# Patient Record
Sex: MALE | Race: BLACK OR AFRICAN AMERICAN | NOT HISPANIC OR LATINO | URBAN - METROPOLITAN AREA
[De-identification: names, ages, dates, MRNs, and addresses within clinical notes are randomized per-mention and may not be internally consistent; named-entity substitution may affect disease eponyms.]

---

## 2024-01-26 ENCOUNTER — HOSPITAL ENCOUNTER (EMERGENCY)
Facility: HOSPITAL | Age: 29
Discharge: HOME | End: 2024-01-26
Payer: COMMERCIAL

## 2024-01-26 ENCOUNTER — APPOINTMENT (OUTPATIENT)
Dept: RADIOLOGY | Facility: HOSPITAL | Age: 29
End: 2024-01-26
Payer: COMMERCIAL

## 2024-01-26 VITALS
RESPIRATION RATE: 18 BRPM | TEMPERATURE: 97.5 F | WEIGHT: 170 LBS | DIASTOLIC BLOOD PRESSURE: 74 MMHG | BODY MASS INDEX: 22.53 KG/M2 | SYSTOLIC BLOOD PRESSURE: 121 MMHG | HEART RATE: 56 BPM | OXYGEN SATURATION: 99 % | HEIGHT: 73 IN

## 2024-01-26 DIAGNOSIS — M54.31 SCIATICA, RIGHT SIDE: ICD-10-CM

## 2024-01-26 DIAGNOSIS — M79.671 RIGHT FOOT PAIN: ICD-10-CM

## 2024-01-26 DIAGNOSIS — M25.551 RIGHT HIP PAIN: Primary | ICD-10-CM

## 2024-01-26 PROCEDURE — 99283 EMERGENCY DEPT VISIT LOW MDM: CPT | Mod: 27

## 2024-01-26 PROCEDURE — 2500000001 HC RX 250 WO HCPCS SELF ADMINISTERED DRUGS (ALT 637 FOR MEDICARE OP): Performed by: PHYSICIAN ASSISTANT

## 2024-01-26 PROCEDURE — 2500000004 HC RX 250 GENERAL PHARMACY W/ HCPCS (ALT 636 FOR OP/ED): Performed by: PHYSICIAN ASSISTANT

## 2024-01-26 PROCEDURE — 99284 EMERGENCY DEPT VISIT MOD MDM: CPT | Mod: 27

## 2024-01-26 PROCEDURE — 73502 X-RAY EXAM HIP UNI 2-3 VIEWS: CPT | Mod: RIGHT SIDE | Performed by: RADIOLOGY

## 2024-01-26 PROCEDURE — 73502 X-RAY EXAM HIP UNI 2-3 VIEWS: CPT | Mod: RT

## 2024-01-26 PROCEDURE — 96372 THER/PROPH/DIAG INJ SC/IM: CPT

## 2024-01-26 RX ORDER — CYCLOBENZAPRINE HCL 5 MG
5 TABLET ORAL ONCE
Status: COMPLETED | OUTPATIENT
Start: 2024-01-26 | End: 2024-01-26

## 2024-01-26 RX ORDER — PREDNISONE 20 MG/1
20 TABLET ORAL DAILY
Qty: 4 TABLET | Refills: 0 | Status: SHIPPED | OUTPATIENT
Start: 2024-01-26 | End: 2024-01-31 | Stop reason: ALTCHOICE

## 2024-01-26 RX ORDER — PREDNISONE 20 MG/1
20 TABLET ORAL ONCE
Status: COMPLETED | OUTPATIENT
Start: 2024-01-26 | End: 2024-01-26

## 2024-01-26 RX ORDER — CYCLOBENZAPRINE HCL 5 MG
5 TABLET ORAL 3 TIMES DAILY PRN
Qty: 12 TABLET | Refills: 0 | Status: SHIPPED | OUTPATIENT
Start: 2024-01-26 | End: 2024-01-30 | Stop reason: SDUPTHER

## 2024-01-26 RX ORDER — KETOROLAC TROMETHAMINE 30 MG/ML
30 INJECTION, SOLUTION INTRAMUSCULAR; INTRAVENOUS ONCE
Status: COMPLETED | OUTPATIENT
Start: 2024-01-26 | End: 2024-01-26

## 2024-01-26 RX ADMIN — PREDNISONE 20 MG: 20 TABLET ORAL at 14:20

## 2024-01-26 RX ADMIN — KETOROLAC TROMETHAMINE 30 MG: 30 INJECTION, SOLUTION INTRAMUSCULAR; INTRAVENOUS at 14:20

## 2024-01-26 RX ADMIN — CYCLOBENZAPRINE HYDROCHLORIDE 5 MG: 5 TABLET, FILM COATED ORAL at 14:20

## 2024-01-26 ASSESSMENT — PAIN DESCRIPTION - LOCATION: LOCATION: BACK

## 2024-01-26 ASSESSMENT — COLUMBIA-SUICIDE SEVERITY RATING SCALE - C-SSRS
6. HAVE YOU EVER DONE ANYTHING, STARTED TO DO ANYTHING, OR PREPARED TO DO ANYTHING TO END YOUR LIFE?: NO
1. IN THE PAST MONTH, HAVE YOU WISHED YOU WERE DEAD OR WISHED YOU COULD GO TO SLEEP AND NOT WAKE UP?: NO
2. HAVE YOU ACTUALLY HAD ANY THOUGHTS OF KILLING YOURSELF?: NO

## 2024-01-26 ASSESSMENT — PAIN - FUNCTIONAL ASSESSMENT: PAIN_FUNCTIONAL_ASSESSMENT: 0-10

## 2024-01-26 ASSESSMENT — PAIN DESCRIPTION - ORIENTATION: ORIENTATION: RIGHT

## 2024-01-26 ASSESSMENT — PAIN SCALES - GENERAL
PAINLEVEL_OUTOF10: 10 - WORST POSSIBLE PAIN
PAINLEVEL_OUTOF10: 0 - NO PAIN
PAINLEVEL_OUTOF10: 9

## 2024-01-26 NOTE — DISCHARGE INSTRUCTIONS
Please begin taking prednisone.  Take this medication daily for the next 4 days.  Do not take today because you are given your dose in the ER.  May take cyclobenzaprine as needed for pain.  This is a muscle relaxer may cause drowsiness.  Do not drive, drink alcohol or operate heavy machinery while using this medication.  Continue Tylenol and/or NSAIDs as needed for pain.  Follow-up with PMR.  Follow-up with primary care.  Return to ER for any new or worsening symptoms.

## 2024-01-26 NOTE — ED PROVIDER NOTES
"Chief Complaint   Patient presents with    Back Pain   HPI:   Shankar Hays is an 28 y.o. with history of craniectomy, right partial meniscus tear, left rotator cuff surgery that presents to the ED for evaluation of right hip pain that radiates from right hip distally across anterior thigh into medial knee and down medial aspect of leg into foot.  Patient said the pain has been bothering him off and on for 2 years.  Over the past 6 months it has gotten worse.  He endorses 7/10 pain at rest.  8-9/10 pain when moving or walking.  Occasionally he gets \"zaps\" of electrical pain down the leg that can be 10/10 or worse. He said those zaps come at random times and do not seem to be related to anything that he can identify. Has not taken any medication for the pain recently.  He denies any numbness.  Endorses occasional tingling.  Denies any trauma or injury.  Said he feels like his right foot arch is swelling.  Denies any back pain.  Denies any loss of bowel or bladder, urinary retention, saddle anesthesia, history of IVDA or cancer, fevers.  Patient does note that he works for [employer] and stands on cement floors for his entire shift.  He wears special shoes with inserts but he said they do not help.    Medications: None  Soc HX: Works at [employer].  Occasional marijuana use  Allergies   Allergen Reactions    Bactrim [Sulfamethoxazole-Trimethoprim] Hives   :  No past medical history on file.  No past surgical history on file.  No family history on file.     Physical Exam  Vitals and nursing note reviewed.   Constitutional:       General: He is not in acute distress.     Appearance: Normal appearance. He is not ill-appearing or toxic-appearing.      Comments: Tall and slender   HENT:      Right Ear: External ear normal.      Left Ear: External ear normal.   Eyes:      Pupils: Pupils are equal, round, and reactive to light.   Cardiovascular:      Rate and Rhythm: Normal rate and regular rhythm.      Pulses: Normal pulses.      " Heart sounds: No murmur heard.  Pulmonary:      Effort: Pulmonary effort is normal. No respiratory distress.      Breath sounds: Normal breath sounds.   Abdominal:      General: Bowel sounds are normal.      Palpations: Abdomen is soft.      Tenderness: There is no abdominal tenderness. There is no guarding or rebound.      Hernia: No hernia is present.      Comments: No hernia   Musculoskeletal:         General: No deformity or signs of injury. Normal range of motion.      Cervical back: Normal range of motion. No tenderness.      Comments: Point tenderness right hip and medial right knee.  Tenderness with palpation along the arch of right foot.  Passive ROM of ankle knee and hip elicits pain.  Normal flexion and extension.  No obvious skin changes, erythema, warmth.   Skin:     General: Skin is warm and dry.      Capillary Refill: Capillary refill takes less than 2 seconds.      Findings: No bruising or rash.   Neurological:      General: No focal deficit present.      Mental Status: He is alert.      Cranial Nerves: No cranial nerve deficit.      Sensory: No sensory deficit.      Motor: No weakness.      Gait: Gait abnormal (Antalgic gait).      Deep Tendon Reflexes: Reflexes normal.   VS: As documented in the triage note and EMR flowsheet from this visit were reviewed.      Medical Decision Making:   ED Course as of 01/26/24 1701   Fri Jan 26, 2024   1349 Vitals Reviewed: Afebrile. Normotensive. Not tachycardic nor tachypneic. No hypoxia.   [KA]   1403 Patient is well-appearing 28-year-old male that presents to the ED for evaluation of right hip pain.  Antalgic gait.  Tenderness to palpation right hip.  Positive right-sided straight leg raise.  5/5 strength bilateral lower extremities.  Normal DTRs.  Symmetrical pulses.  He reports pain with passive ROM of the ankle knee and hip along with tenderness to palpation along the arch of the foot.  Will obtain x-ray of the hip/pelvis.  Patient to be given Toradol,  Flexeril and 20 mg prednisone. [KA]   6197 X-ray imaging normal.  Patient reports improvement in symptoms following medication administration.  Will discharge home on prednisone burst with Flexeril.  Advised that he should continue Tylenol as needed.  Have placed PMR referral.  He says he is seeing his PCP for this issue on Tuesday.  Patient to be given work note.  Encouraged him to return to ER for any new or worsening symptoms. [KA]      ED Course User Index  [KA] Humaira Marcus PA-C         Diagnoses as of 01/26/24 1701   Right hip pain   Right foot pain   Sciatica, right side      Escalation of Care: Appropriate for outpatient management   Counseling: Spoke with the patient and discussed today´s findings, in addition to providing specific details for the plan of care and expected course.  Patient was given the opportunity to ask questions.    Discussed return precautions and importance of follow-up.  Advised to follow-up with PCP/Ortho/PMR.  Advised to return to the ED for changing or worsening symptoms, new symptoms, complaint specific precautions, and precautions listed on the discharge paperwork.  Educated on the common potential side effects of medications prescribed.    I advised the patient that the emergency evaluation and treatment provided today doesn't end their need for medical care. It is very important that they follow-up with their primary care provider or other specialist as instructed.    The plan of care was mutually agreed upon with the patient. The patient and/or family were given the opportunity to ask questions. All questions asked today in the ED were answered to the best of my ability with today's information.    I specifically advised the patient to return to the ED for changing or worsening symptoms, worrisome new symptoms, or for any complaint specific precautions listed on the discharge paperwork.    This patient was cared for in the setting of nationwide stress on resources and  staffing.    This report was transcribed using voice recognition software.  Every effort was made to ensure accuracy, however, inadvertently computerized transcription errors may be present.       Humaira Marcus PA-C  01/26/24 3082

## 2024-01-26 NOTE — Clinical Note
Shankar Hays was seen and treated in our emergency department on 1/26/2024.  He may return to work on 01/27/2024.  Light duty for the next 5 days or until cleared by primary care doctor     If you have any questions or concerns, please don't hesitate to call.      Humaira Marcus PA-C

## 2024-01-26 NOTE — ED TRIAGE NOTES
Pt arrives via triage with complaint of right hip pain radiating down to the right foot. St has happened in the past and improved with chiropractor. Pt has hx of sciatic nerve pain. Ambulatory in triage.

## 2024-01-30 ENCOUNTER — OFFICE VISIT (OUTPATIENT)
Dept: PRIMARY CARE | Facility: CLINIC | Age: 29
End: 2024-01-30
Payer: MEDICARE

## 2024-01-30 ENCOUNTER — PHARMACY VISIT (OUTPATIENT)
Dept: PHARMACY | Facility: CLINIC | Age: 29
End: 2024-01-30
Payer: COMMERCIAL

## 2024-01-30 VITALS
HEART RATE: 66 BPM | TEMPERATURE: 98.6 F | BODY MASS INDEX: 21.93 KG/M2 | WEIGHT: 166.2 LBS | SYSTOLIC BLOOD PRESSURE: 136 MMHG | DIASTOLIC BLOOD PRESSURE: 74 MMHG | OXYGEN SATURATION: 97 %

## 2024-01-30 DIAGNOSIS — M79.671 RIGHT FOOT PAIN: ICD-10-CM

## 2024-01-30 DIAGNOSIS — G89.29 CHRONIC PAIN OF RIGHT KNEE: ICD-10-CM

## 2024-01-30 DIAGNOSIS — Z76.89 ENCOUNTER TO ESTABLISH CARE: Primary | ICD-10-CM

## 2024-01-30 DIAGNOSIS — M25.561 CHRONIC PAIN OF RIGHT KNEE: ICD-10-CM

## 2024-01-30 DIAGNOSIS — M25.551 RIGHT HIP PAIN: ICD-10-CM

## 2024-01-30 DIAGNOSIS — Z02.89 ENCOUNTER FOR COMPLETION OF FORM WITH PATIENT: ICD-10-CM

## 2024-01-30 PROCEDURE — RXMED WILLOW AMBULATORY MEDICATION CHARGE

## 2024-01-30 PROCEDURE — 99213 OFFICE O/P EST LOW 20 MIN: CPT | Performed by: STUDENT IN AN ORGANIZED HEALTH CARE EDUCATION/TRAINING PROGRAM

## 2024-01-30 RX ORDER — CYCLOBENZAPRINE HCL 5 MG
5 TABLET ORAL 3 TIMES DAILY PRN
Qty: 30 TABLET | Refills: 1 | Status: SHIPPED | OUTPATIENT
Start: 2024-01-30 | End: 2024-05-01 | Stop reason: HOSPADM

## 2024-01-30 RX ORDER — GABAPENTIN 100 MG/1
100 CAPSULE ORAL 3 TIMES DAILY
Qty: 90 CAPSULE | Refills: 1 | Status: SHIPPED | OUTPATIENT
Start: 2024-01-30 | End: 2024-05-01 | Stop reason: HOSPADM

## 2024-01-30 NOTE — PATIENT INSTRUCTIONS
Dear Shankar Hays,     It was a pleasure getting to manage your care with you today.     Prescriptions:  We have sent medication prescriptions to your pharmacy on file. Please pick them up at your earliest convenience.   Take gabapentin every day. Start taking 1 pill at night for a few days. If you tolerate it well, increase to taking it 3 times a day.  Take Flexeril 3 times a day as needed.  Both gabapentin and Flexeril can make you sleepy, make sure you know how you respond before taking them at work.    Referral:   We have provided you the below referrals. Please call to schedule referrals if no one has contacted you within 3 days.   1. X-ray back and knee  2. Podiatry (foot doctor)    Follow up Appointment: 6 weeks for pain follow up    Emergency  In the case of an emergency please call 911 or visit the Emergency Department immediately for evaluation.     We look forward to continuing your care here at our Clinic. Take Care.     Sincerely,   Babatunde Sepulveda MD

## 2024-01-30 NOTE — PROGRESS NOTES
"Subjective   Patient ID: Shankar Hays is a 28 y.o. male who presents for paperwork for work.    Having \"sciatic nerve\" pain and needs accommodations for work. Also notes that right foot is swollen and painful, limiting ability to stand for prolonged periods. Shifts are usually 10.5 hours of standing and he is developing pretty severe pain by the 7 hour ronit. Pain started about 1-2 years ago at right lower back. Felt like a shooting pain that would make him wince and make his leg give out. Pain started moving more to the lateral hip as well with a tingle. Also noting pain now in the knee and bottom of the foot. Pain feels like fire. Symptom is happening daily. Foot swelling has really only been over the last month and he feels like his arch is collapsing. No injuries to the back or foot; had a R knee injury to the meniscus in 2018 that was not managed surgically. Trying to bend the foot when it's swollen causes a burning pain. Wears composite boots for work. No clear triggers for the pain; seems to be based on maintaining a certain position for too long. No history of surgery to the back or leg. Gets slight swelling around the right knee. No heavy lifting for work, max is probably 30 lbs. Denies any numbness or tingling in the feet.      Review of Systems  As above in HPI    Objective   /74 (BP Location: Right arm, Patient Position: Sitting, BP Cuff Size: Adult)   Pulse 66   Temp 37 °C (98.6 °F) (Temporal)   Wt 75.4 kg (166 lb 3.2 oz)   SpO2 97%   BMI 21.93 kg/m²  Body mass index is 21.93 kg/m².    Physical Exam  Vitals reviewed.   Constitutional:       General: He is not in acute distress.     Appearance: Normal appearance.   HENT:      Head: Normocephalic and atraumatic.   Eyes:      Conjunctiva/sclera: Conjunctivae normal.   Cardiovascular:      Rate and Rhythm: Normal rate and regular rhythm.      Heart sounds: No murmur heard.     No friction rub. No gallop.   Pulmonary:      Effort: Pulmonary " effort is normal. No respiratory distress.      Breath sounds: Normal breath sounds. No wheezing, rhonchi or rales.   Musculoskeletal:      Cervical back: Normal and neck supple.      Thoracic back: Normal.      Lumbar back: Tenderness (mild R paralumbar tenderness) present. No bony tenderness. Positive right straight leg raise test (pain with straight leg raise, but pain stops at the knee). Negative left straight leg raise test.      Right hip: Tenderness (mild tenderness over lateral hip and inguinal region) present.      Right knee: Tenderness (mild tenderness superomedial to the knee joint; no tenderness of the knee joint itself) present.      Right lower leg: Normal.      Right foot: Normal capillary refill. Swelling (per patient, no significant edema evident on exam) and tenderness (generalized, non-localized tenderness of the foot) present. Normal pulse.   Skin:     General: Skin is warm and dry.   Neurological:      General: No focal deficit present.      Mental Status: He is alert. Mental status is at baseline.      Sensory: Sensation is intact. No sensory deficit (grossly intact).      Motor: No weakness (5/5 strength with hip flexion, abduction, adduction, knee flexion and extension).      Gait: Gait abnormal (antalgic gait favoring RLE).   Psychiatric:         Mood and Affect: Affect normal.         Behavior: Behavior normal.       Assessment/Plan   Shankar Hays is a 29yo male with a history of pansinusitis with cranial epidural abscess s/p evacuation who presents to [facility] care and for evaluation of RLE pain.    Problem List Items Addressed This Visit             ICD-10-CM    Right foot pain M79.671     -suspect pain and swelling in the foot is separate from pain in the hip, although changes in gait mechanics may be contributing  -suspect foot pain is more related to prolonged standing and shoe fit  -referral to podiatry for further evaluation         Relevant Medications    cyclobenzaprine  (Flexeril) 5 mg tablet    Other Relevant Orders    Referral to Podiatry    Right hip pain M25.551     -no clear localizing source of pain on exam although given description that it starts in the right lateral lower back and radiates to the front I suspect pain is more radicular in nature than from the hip joint  -recent negative hip x-ray at urgent care  -XR lumbar spine for further evaluation  -pain management with PRN Flexeril, PRN NSAIDs and Tylenol  -trial of gabapentin 100 mg TID for possible neuropathic component  -PT evaluation         Relevant Medications    cyclobenzaprine (Flexeril) 5 mg tablet    gabapentin (Neurontin) 100 mg capsule    Other Relevant Orders    XR lumbar spine complete 4+ views    Referral to Physical Therapy    Chronic pain of right knee M25.561, G89.29     -history of R knee partial meniscus tear, managed conservatively w/ PT in the past now with recurrent knee pain  -suspect knee pain is related to exacerbation of prior injury d/t altered gait mechanics  -given history of meniscal injury will obtain x-ray for further evaluation  -pain management and PT referral as for hip pain         Relevant Orders    XR knee right 3 views     Other Visit Diagnoses         Codes    Encounter to establish care    -  Primary Z76.89    Encounter for completion of form with patient     Z02.89    -LA paperwork completed for patient         Follow up in 6 weeks for reassessment of pain.    Patient discussed with Dr. Nielson.    Babatunde Sepulveda MD   PGY-3

## 2024-01-31 PROBLEM — G89.29 CHRONIC PAIN OF RIGHT KNEE: Status: ACTIVE | Noted: 2024-01-31

## 2024-01-31 PROBLEM — M79.671 RIGHT FOOT PAIN: Status: ACTIVE | Noted: 2024-01-31

## 2024-01-31 PROBLEM — M25.551 PAIN OF RIGHT HIP JOINT: Status: ACTIVE | Noted: 2024-01-31

## 2024-01-31 PROBLEM — M25.561 CHRONIC PAIN OF RIGHT KNEE: Status: ACTIVE | Noted: 2024-01-31

## 2024-01-31 RX ORDER — NAPROXEN 250 MG/1
250 TABLET ORAL
COMMUNITY
End: 2024-05-01 | Stop reason: HOSPADM

## 2024-01-31 RX ORDER — FLUTICASONE PROPIONATE 50 MCG
1 SPRAY, SUSPENSION (ML) NASAL DAILY
COMMUNITY

## 2024-02-01 NOTE — ASSESSMENT & PLAN NOTE
-history of R knee partial meniscus tear, managed conservatively w/ PT in the past now with recurrent knee pain  -suspect knee pain is related to exacerbation of prior injury d/t altered gait mechanics  -given history of meniscal injury will obtain x-ray for further evaluation  -pain management and PT referral as for hip pain

## 2024-02-01 NOTE — ASSESSMENT & PLAN NOTE
-no clear localizing source of pain on exam although given description that it starts in the right lateral lower back and radiates to the front I suspect pain is more radicular in nature than from the hip joint  -recent negative hip x-ray at urgent care  -XR lumbar spine for further evaluation  -pain management with PRN Flexeril, PRN NSAIDs and Tylenol  -trial of gabapentin 100 mg TID for possible neuropathic component  -PT evaluation

## 2024-02-01 NOTE — ASSESSMENT & PLAN NOTE
-suspect pain and swelling in the foot is separate from pain in the hip, although changes in gait mechanics may be contributing  -suspect foot pain is more related to prolonged standing and shoe fit  -referral to podiatry for further evaluation

## 2024-02-03 NOTE — PROGRESS NOTES
I reviewed the resident/fellow's documentation and discussed the patient with the resident/fellow. I agree with the resident/fellow's medical decision making as documented in the note.     Linda Nielson MD

## 2024-02-16 ENCOUNTER — HOSPITAL ENCOUNTER (EMERGENCY)
Facility: HOSPITAL | Age: 29
Discharge: HOME | End: 2024-02-16
Payer: COMMERCIAL

## 2024-02-16 ENCOUNTER — APPOINTMENT (OUTPATIENT)
Dept: RADIOLOGY | Facility: HOSPITAL | Age: 29
End: 2024-02-16
Payer: COMMERCIAL

## 2024-02-16 ENCOUNTER — TELEMEDICINE (OUTPATIENT)
Dept: PRIMARY CARE | Facility: CLINIC | Age: 29
End: 2024-02-16
Payer: COMMERCIAL

## 2024-02-16 VITALS
SYSTOLIC BLOOD PRESSURE: 115 MMHG | RESPIRATION RATE: 16 BRPM | TEMPERATURE: 97.7 F | DIASTOLIC BLOOD PRESSURE: 70 MMHG | HEART RATE: 90 BPM | OXYGEN SATURATION: 98 %

## 2024-02-16 DIAGNOSIS — M72.2 PLANTAR FASCIITIS: ICD-10-CM

## 2024-02-16 DIAGNOSIS — M54.31 SCIATICA OF RIGHT SIDE: Primary | ICD-10-CM

## 2024-02-16 DIAGNOSIS — M79.671 RIGHT FOOT PAIN: Primary | ICD-10-CM

## 2024-02-16 PROCEDURE — 96372 THER/PROPH/DIAG INJ SC/IM: CPT

## 2024-02-16 PROCEDURE — 73630 X-RAY EXAM OF FOOT: CPT | Mod: RIGHT SIDE | Performed by: RADIOLOGY

## 2024-02-16 PROCEDURE — 2500000004 HC RX 250 GENERAL PHARMACY W/ HCPCS (ALT 636 FOR OP/ED): Performed by: PHYSICIAN ASSISTANT

## 2024-02-16 PROCEDURE — 99284 EMERGENCY DEPT VISIT MOD MDM: CPT

## 2024-02-16 PROCEDURE — 73630 X-RAY EXAM OF FOOT: CPT | Mod: RT

## 2024-02-16 PROCEDURE — 99283 EMERGENCY DEPT VISIT LOW MDM: CPT

## 2024-02-16 PROCEDURE — 99284 EMERGENCY DEPT VISIT MOD MDM: CPT | Performed by: PHYSICIAN ASSISTANT

## 2024-02-16 RX ORDER — CYCLOBENZAPRINE HCL 10 MG
10 TABLET ORAL 3 TIMES DAILY PRN
Qty: 9 TABLET | Refills: 0 | Status: SHIPPED | OUTPATIENT
Start: 2024-02-16 | End: 2024-05-01 | Stop reason: HOSPADM

## 2024-02-16 RX ORDER — KETOROLAC TROMETHAMINE 30 MG/ML
30 INJECTION, SOLUTION INTRAMUSCULAR; INTRAVENOUS ONCE
Status: COMPLETED | OUTPATIENT
Start: 2024-02-16 | End: 2024-02-16

## 2024-02-16 RX ORDER — ORPHENADRINE CITRATE 30 MG/ML
60 INJECTION INTRAMUSCULAR; INTRAVENOUS ONCE
Status: COMPLETED | OUTPATIENT
Start: 2024-02-16 | End: 2024-02-16

## 2024-02-16 RX ORDER — NAPROXEN 500 MG/1
500 TABLET ORAL
Qty: 14 TABLET | Refills: 0 | Status: SHIPPED | OUTPATIENT
Start: 2024-02-16 | End: 2024-02-23

## 2024-02-16 RX ADMIN — KETOROLAC TROMETHAMINE 30 MG: 30 INJECTION INTRAMUSCULAR; INTRAVENOUS at 11:30

## 2024-02-16 RX ADMIN — ORPHENADRINE CITRATE 60 MG: 60 INJECTION INTRAMUSCULAR; INTRAVENOUS at 11:30

## 2024-02-16 ASSESSMENT — LIFESTYLE VARIABLES
EVER FELT BAD OR GUILTY ABOUT YOUR DRINKING: NO
EVER HAD A DRINK FIRST THING IN THE MORNING TO STEADY YOUR NERVES TO GET RID OF A HANGOVER: NO
HAVE PEOPLE ANNOYED YOU BY CRITICIZING YOUR DRINKING: NO
HAVE YOU EVER FELT YOU SHOULD CUT DOWN ON YOUR DRINKING: NO

## 2024-02-16 ASSESSMENT — PAIN - FUNCTIONAL ASSESSMENT: PAIN_FUNCTIONAL_ASSESSMENT: 0-10

## 2024-02-16 ASSESSMENT — PAIN DESCRIPTION - LOCATION: LOCATION: FOOT

## 2024-02-16 ASSESSMENT — PAIN DESCRIPTION - ORIENTATION: ORIENTATION: RIGHT

## 2024-02-16 ASSESSMENT — PAIN SCALES - GENERAL: PAINLEVEL_OUTOF10: 7

## 2024-02-16 NOTE — Clinical Note
Shankar Hays was seen and treated in our emergency department on 2/16/2024.  He may return to work on 02/18/2024.       If you have any questions or concerns, please don't hesitate to call.      Jeni Blank PA-C

## 2024-02-16 NOTE — ED PROVIDER NOTES
This is a 28-year-old male with a past medical history of previous craniotomy as well as sciatica who presents to the ED with right foot pain and swelling around the arch of his foot.  He states that he has had this intermittently for several months.  He also states he will occasionally have pain to his low back that radiates down his leg.  He states that he was told that he had sciatica and this is why this happens to him.  He does have a follow-up scheduled with his primary care provider next month.  He states that for his job he does a large amount of standing which he feels worsens his pain.  He states that today his pain was worse which is why he came into the ED.  He also states that he had to miss work today.  He states that he was previously taking gabapentin, steroid as well as Flexeril, however ran out of Flexeril.  He believes these medications were helping his symptoms.  He denies any new injury or trauma.      History provided by:  Patient   used: No             Visit Vitals  /70   Pulse 90   Temp 36.5 °C (97.7 °F) (Temporal)   Resp 16   SpO2 98%   Smoking Status Some Days          Physical Exam     Physical exam:   General: Vitals noted, no distress. Afebrile.   EENT:  Hearing grossly intact. Normal phonation. MMM. Airway patient. PERRL. EOMI.   Neck: No midline tenderness or paraspinal tenderness. FROM.   Cardiac: Regular, rate, rhythm. Normal S1 and S2.  No murmurs, gallops, rubs.   Pulmonary: Good air exchange. Lungs clear bilaterally. No wheezes, rhonchi, rales. No accessory muscle use.   Back: No CVA tenderness.  No midline T or L-spine tenderness palpation.  Right-sided paraspinal lumbar tenderness palpation without any obvious deformity or step-off.  No obvious deformity.   Extremities: No peripheral edema.  Full range of motion. Moves all extremities freely.  Tender to palpation to the plantar aspect of the right foot along the medial aspect, low arch present.  No  associated erythema or excess warmth.  No significant edema.  DP PT pulses full and equal bilaterally.  No other tenderness throughout the lower extremities bilaterally.  Skin: No rash. Warm and Dry.   Neuro: No focal neurologic deficits. CN 2-12 grossly intact. Sensation equal bilaterally. No weakness.         Labs Reviewed - No data to display    No orders to display         ED Course & MDM     Medical Decision Making  This is a 28-year-old male with a past medical history of previous sciatica who presents to the ED with concern for flareup of the sciatic his right foot pain along his arch.  Vital stable upon arrival to the ED.  On physical examination patient was neurovascular intact throughout upper and lower extremities.  He was able to ambulate with a steady gait.  He had full strength and sensation in his lower extremities.  He denied any episodes of bowel or bladder incontinence or saddle anesthesia.  Based on patient's exam and description of his symptoms I have low suspicion for acute pathology involving the patient's spinal cord.  On examination of his right foot patient did have a low arch with tenderness palpation over the arch on the medial aspect of his foot.  DP and PT pulses full and equal bilaterally.  No associated erythema or excess warmth.  No associated edema.  X-ray of patient's foot was ordered.  He was medicated Toradol and Norflex for his pain.  X-ray showed no acute fracture or dislocation of right foot, however there were findings associated with anterior ankle impingement.  On my reevaluation patient was feeling improved and was requesting to be discharged home with a work note.  He was informed of his x-ray results.  He was advised to follow-up with his primary care provider as scheduled.  He was given a prescription for Flexeril and naproxen for his symptoms at home and was discharged from the ED in stable condition.    Amount and/or Complexity of Data Reviewed  Radiology: ordered and  independent interpretation performed.     Details: No visualized fracture or dislocation of patient's right foot    Risk  Prescription drug management.         Diagnoses as of 02/16/24 1232   Sciatica of right side   Plantar fasciitis       Procedures    MARK Chapman, PA-C     Jeni Blank PA-C  02/16/24 1236

## 2024-02-16 NOTE — PROGRESS NOTES
Discussed with patient that this requires in person examination for which he did recently have and was referred to see podiatry for his condition.  This is not something I can appropriately address on the online [employer] care platform.  He is agreeable to seek in person outpatient care accordingly.  I did recommend that he contact his insurance to get the name of 3 podiatrists that are in his network and to call those offices today to see if he can connect with one of them.  He states the podiatrist he was referred to by his PCP was not in his network.

## 2024-03-12 ENCOUNTER — APPOINTMENT (OUTPATIENT)
Dept: PRIMARY CARE | Facility: HOSPITAL | Age: 29
End: 2024-03-12
Payer: COMMERCIAL

## 2024-04-29 ENCOUNTER — CLINICAL SUPPORT (OUTPATIENT)
Dept: EMERGENCY MEDICINE | Facility: HOSPITAL | Age: 29
End: 2024-04-29
Payer: MEDICARE

## 2024-04-29 ENCOUNTER — HOSPITAL ENCOUNTER (EMERGENCY)
Facility: HOSPITAL | Age: 29
Discharge: PSYCHIATRIC OR SUBSTANCE USE FACILITY | End: 2024-04-30
Attending: EMERGENCY MEDICINE
Payer: MEDICARE

## 2024-04-29 DIAGNOSIS — R45.851 SUICIDAL IDEATION: Primary | ICD-10-CM

## 2024-04-29 PROBLEM — F32.A DEPRESSION WITH SUICIDAL IDEATION: Status: ACTIVE | Noted: 2024-04-29

## 2024-04-29 LAB
ALBUMIN SERPL BCP-MCNC: 5.2 G/DL (ref 3.4–5)
ALP SERPL-CCNC: 55 U/L (ref 33–120)
ALT SERPL W P-5'-P-CCNC: 10 U/L (ref 10–52)
AMPHETAMINES UR QL SCN: ABNORMAL
ANION GAP SERPL CALC-SCNC: 18 MMOL/L (ref 10–20)
APAP SERPL-MCNC: <10 UG/ML
AST SERPL W P-5'-P-CCNC: 18 U/L (ref 9–39)
ATRIAL RATE: 80 BPM
BARBITURATES UR QL SCN: ABNORMAL
BASOPHILS # BLD AUTO: 0.03 X10*3/UL (ref 0–0.1)
BASOPHILS NFR BLD AUTO: 0.3 %
BENZODIAZ UR QL SCN: ABNORMAL
BILIRUB SERPL-MCNC: 0.6 MG/DL (ref 0–1.2)
BUN SERPL-MCNC: 10 MG/DL (ref 6–23)
BZE UR QL SCN: ABNORMAL
CALCIUM SERPL-MCNC: 10.2 MG/DL (ref 8.6–10.6)
CANNABINOIDS UR QL SCN: ABNORMAL
CHLORIDE SERPL-SCNC: 105 MMOL/L (ref 98–107)
CO2 SERPL-SCNC: 25 MMOL/L (ref 21–32)
CREAT SERPL-MCNC: 1.01 MG/DL (ref 0.5–1.3)
EGFRCR SERPLBLD CKD-EPI 2021: >90 ML/MIN/1.73M*2
EOSINOPHIL # BLD AUTO: 0.12 X10*3/UL (ref 0–0.7)
EOSINOPHIL NFR BLD AUTO: 1.2 %
ERYTHROCYTE [DISTWIDTH] IN BLOOD BY AUTOMATED COUNT: 11.6 % (ref 11.5–14.5)
ETHANOL SERPL-MCNC: 169 MG/DL
FENTANYL+NORFENTANYL UR QL SCN: ABNORMAL
FLUAV RNA RESP QL NAA+PROBE: NOT DETECTED
FLUBV RNA RESP QL NAA+PROBE: NOT DETECTED
GLUCOSE SERPL-MCNC: 88 MG/DL (ref 74–99)
HCT VFR BLD AUTO: 42.6 % (ref 41–52)
HGB BLD-MCNC: 14.9 G/DL (ref 13.5–17.5)
IMM GRANULOCYTES # BLD AUTO: 0.02 X10*3/UL (ref 0–0.7)
IMM GRANULOCYTES NFR BLD AUTO: 0.2 % (ref 0–0.9)
LYMPHOCYTES # BLD AUTO: 4.24 X10*3/UL (ref 1.2–4.8)
LYMPHOCYTES NFR BLD AUTO: 43 %
MCH RBC QN AUTO: 33 PG (ref 26–34)
MCHC RBC AUTO-ENTMCNC: 35 G/DL (ref 32–36)
MCV RBC AUTO: 94 FL (ref 80–100)
METHADONE UR QL SCN: ABNORMAL
MONOCYTES # BLD AUTO: 0.69 X10*3/UL (ref 0.1–1)
MONOCYTES NFR BLD AUTO: 7 %
NEUTROPHILS # BLD AUTO: 4.77 X10*3/UL (ref 1.2–7.7)
NEUTROPHILS NFR BLD AUTO: 48.3 %
NRBC BLD-RTO: 0 /100 WBCS (ref 0–0)
OPIATES UR QL SCN: ABNORMAL
OXYCODONE+OXYMORPHONE UR QL SCN: ABNORMAL
P AXIS: 74 DEGREES
P OFFSET: 197 MS
P ONSET: 145 MS
PCP UR QL SCN: ABNORMAL
PLATELET # BLD AUTO: 239 X10*3/UL (ref 150–450)
POTASSIUM SERPL-SCNC: 3.6 MMOL/L (ref 3.5–5.3)
PR INTERVAL: 156 MS
PROT SERPL-MCNC: 8.2 G/DL (ref 6.4–8.2)
Q ONSET: 223 MS
QRS COUNT: 13 BEATS
QRS DURATION: 82 MS
QT INTERVAL: 344 MS
QTC CALCULATION(BAZETT): 396 MS
QTC FREDERICIA: 378 MS
R AXIS: 57 DEGREES
RBC # BLD AUTO: 4.52 X10*6/UL (ref 4.5–5.9)
SALICYLATES SERPL-MCNC: <3 MG/DL
SARS-COV-2 RNA RESP QL NAA+PROBE: NOT DETECTED
SODIUM SERPL-SCNC: 144 MMOL/L (ref 136–145)
T AXIS: 56 DEGREES
T OFFSET: 395 MS
VENTRICULAR RATE: 80 BPM
WBC # BLD AUTO: 9.9 X10*3/UL (ref 4.4–11.3)

## 2024-04-29 PROCEDURE — 99222 1ST HOSP IP/OBS MODERATE 55: CPT

## 2024-04-29 PROCEDURE — 87636 SARSCOV2 & INF A&B AMP PRB: CPT

## 2024-04-29 PROCEDURE — 2500000001 HC RX 250 WO HCPCS SELF ADMINISTERED DRUGS (ALT 637 FOR MEDICARE OP)

## 2024-04-29 PROCEDURE — 85025 COMPLETE CBC W/AUTO DIFF WBC: CPT | Performed by: EMERGENCY MEDICINE

## 2024-04-29 PROCEDURE — 80053 COMPREHEN METABOLIC PANEL: CPT | Performed by: EMERGENCY MEDICINE

## 2024-04-29 PROCEDURE — 80307 DRUG TEST PRSMV CHEM ANLYZR: CPT | Performed by: EMERGENCY MEDICINE

## 2024-04-29 PROCEDURE — 99285 EMERGENCY DEPT VISIT HI MDM: CPT | Mod: 25

## 2024-04-29 PROCEDURE — 93010 ELECTROCARDIOGRAM REPORT: CPT | Performed by: PHYSICIAN ASSISTANT

## 2024-04-29 PROCEDURE — 36415 COLL VENOUS BLD VENIPUNCTURE: CPT | Performed by: EMERGENCY MEDICINE

## 2024-04-29 PROCEDURE — 99285 EMERGENCY DEPT VISIT HI MDM: CPT | Performed by: EMERGENCY MEDICINE

## 2024-04-29 PROCEDURE — 80143 DRUG ASSAY ACETAMINOPHEN: CPT | Performed by: EMERGENCY MEDICINE

## 2024-04-29 PROCEDURE — 93005 ELECTROCARDIOGRAM TRACING: CPT

## 2024-04-29 RX ORDER — ACETAMINOPHEN 500 MG
5 TABLET ORAL DAILY
Status: DISCONTINUED | OUTPATIENT
Start: 2024-04-29 | End: 2024-04-30 | Stop reason: HOSPADM

## 2024-04-29 RX ADMIN — Medication 5 MG: at 19:00

## 2024-04-29 ASSESSMENT — LIFESTYLE VARIABLES
EVER HAD A DRINK FIRST THING IN THE MORNING TO STEADY YOUR NERVES TO GET RID OF A HANGOVER: NO
HAVE PEOPLE ANNOYED YOU BY CRITICIZING YOUR DRINKING: NO
EVER FELT BAD OR GUILTY ABOUT YOUR DRINKING: NO
HAVE YOU EVER FELT YOU SHOULD CUT DOWN ON YOUR DRINKING: NO
TOTAL SCORE: 0

## 2024-04-29 ASSESSMENT — COLUMBIA-SUICIDE SEVERITY RATING SCALE - C-SSRS
6. HAVE YOU EVER DONE ANYTHING, STARTED TO DO ANYTHING, OR PREPARED TO DO ANYTHING TO END YOUR LIFE?: YES
1. IN THE PAST MONTH, HAVE YOU WISHED YOU WERE DEAD OR WISHED YOU COULD GO TO SLEEP AND NOT WAKE UP?: YES
1. SINCE LAST CONTACT, HAVE YOU WISHED YOU WERE DEAD OR WISHED YOU COULD GO TO SLEEP AND NOT WAKE UP?: YES
2. HAVE YOU ACTUALLY HAD ANY THOUGHTS OF KILLING YOURSELF?: YES
6. HAVE YOU EVER DONE ANYTHING, STARTED TO DO ANYTHING, OR PREPARED TO DO ANYTHING TO END YOUR LIFE?: NO
4. HAVE YOU HAD THESE THOUGHTS AND HAD SOME INTENTION OF ACTING ON THEM?: YES
2. HAVE YOU ACTUALLY HAD ANY THOUGHTS OF KILLING YOURSELF?: YES
5. HAVE YOU STARTED TO WORK OUT OR WORKED OUT THE DETAILS OF HOW TO KILL YOURSELF? DO YOU INTEND TO CARRY OUT THIS PLAN?: NO
5. HAVE YOU STARTED TO WORK OUT OR WORKED OUT THE DETAILS OF HOW TO KILL YOURSELF? DO YOU INTEND TO CARRY OUT THIS PLAN?: YES

## 2024-04-29 ASSESSMENT — PAIN - FUNCTIONAL ASSESSMENT: PAIN_FUNCTIONAL_ASSESSMENT: 0-10

## 2024-04-29 ASSESSMENT — PAIN DESCRIPTION - PROGRESSION: CLINICAL_PROGRESSION: NOT CHANGED
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
## 2024-04-29 NOTE — PROGRESS NOTES
Handoff Note    I received Shankar Hays in signout from Dr. Terry.  Please see the previous note for all HPI, PE and MDM up to the time of signout at 0700.    In brief Shankar Hays is an 29 y.o. male presenting for   Chief Complaint   Patient presents with   • Psychiatric Evaluation   • Ingestion         At the time of signout, the patient's disposition is pending EPAT evaluation.  This is a 29-year-old male who presented with EMS after he was found lying in the driveway.  He had been mildly intoxicated however was also complaining of suicidal thoughts.  EPAT have been consulted by the previous provider and their evaluation and recommendations are pending at the time of signout.  During my shift, nursing staff had difficulty enforcing elopement precaution rules including having the patient out of his close and into a hospital gown for EPAT assessment.  I was called to the patient's bedside where he expressed frustration due to needing to comply with policies all of a sudden when he had been allowed overnight to rest with his close I am belongings.  Fortunately the patient was able to be reasoned with and eventually agreed to comply with policies in order to speak with EPAT services as he wishes to get help for his mental health.  During the discussion he reports that he took an undisclosed amount of his gabapentin pills in an attempt to hurt himself.  EPAT then able to evaluate the patient and is recommending placement due to his suicidal thoughts and actions.  He remained cooperative and calm and did not require any antipsychotic or anxiolytic medications throughout the rest of the shift.  At this time he is handed off to the oncoming emergency department provider pending placement to psychiatric facility.      Throughout the ED stay, the patient was monitored and re-examined for any changes in stability or symptomatology.     ED Course:   Diagnoses as of 04/30/24 0743   Suicidal ideation         Patient seen  by and discussed with attending emergency medicine physician, Dr. Hernandez    Pt Disposition: Handoff    Procedures      Darci Ayala DO  Emergency Medicine PGY-2  [facility]
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
## 2024-04-30 ENCOUNTER — HOSPITAL ENCOUNTER (INPATIENT)
Facility: HOSPITAL | Age: 29
LOS: 1 days | Discharge: HOME | DRG: 885 | End: 2024-05-01
Attending: PSYCHIATRY & NEUROLOGY | Admitting: PSYCHIATRY & NEUROLOGY
Payer: MEDICARE

## 2024-04-30 VITALS
SYSTOLIC BLOOD PRESSURE: 122 MMHG | OXYGEN SATURATION: 98 % | TEMPERATURE: 97.9 F | BODY MASS INDEX: 22.26 KG/M2 | DIASTOLIC BLOOD PRESSURE: 75 MMHG | RESPIRATION RATE: 16 BRPM | HEART RATE: 61 BPM | HEIGHT: 73 IN | WEIGHT: 168 LBS

## 2024-04-30 DIAGNOSIS — F17.200 TOBACCO DEPENDENCE: ICD-10-CM

## 2024-04-30 DIAGNOSIS — F32.89 OTHER SPECIFIED DEPRESSIVE DISORDER: Primary | ICD-10-CM

## 2024-04-30 LAB
25(OH)D3 SERPL-MCNC: 6 NG/ML (ref 30–100)
CHOLEST SERPL-MCNC: 138 MG/DL (ref 0–199)
CHOLESTEROL/HDL RATIO: 2.9
GLUCOSE P FAST SERPL-MCNC: 107 MG/DL (ref 74–99)
HDLC SERPL-MCNC: 47.7 MG/DL
LDLC SERPL CALC-MCNC: 61 MG/DL
NON HDL CHOLESTEROL: 90 MG/DL (ref 0–149)
TRIGL SERPL-MCNC: 145 MG/DL (ref 0–149)
VLDL: 29 MG/DL (ref 0–40)

## 2024-04-30 PROCEDURE — 82947 ASSAY GLUCOSE BLOOD QUANT: CPT | Performed by: PSYCHIATRY & NEUROLOGY

## 2024-04-30 PROCEDURE — 2500000001 HC RX 250 WO HCPCS SELF ADMINISTERED DRUGS (ALT 637 FOR MEDICARE OP): Performed by: PSYCHIATRY & NEUROLOGY

## 2024-04-30 PROCEDURE — 99221 1ST HOSP IP/OBS SF/LOW 40: CPT | Performed by: NURSE PRACTITIONER

## 2024-04-30 PROCEDURE — 2500000006 HC RX 250 W HCPCS SELF ADMINISTERED DRUGS (ALT 637 FOR ALL PAYERS): Performed by: PSYCHIATRY & NEUROLOGY

## 2024-04-30 PROCEDURE — 1240000001 HC SEMI-PRIVATE BH ROOM DAILY

## 2024-04-30 PROCEDURE — 80061 LIPID PANEL: CPT | Performed by: PSYCHIATRY & NEUROLOGY

## 2024-04-30 PROCEDURE — 36415 COLL VENOUS BLD VENIPUNCTURE: CPT | Performed by: PSYCHIATRY & NEUROLOGY

## 2024-04-30 PROCEDURE — 2500000002 HC RX 250 W HCPCS SELF ADMINISTERED DRUGS (ALT 637 FOR MEDICARE OP, ALT 636 FOR OP/ED): Performed by: PSYCHIATRY & NEUROLOGY

## 2024-04-30 PROCEDURE — 99223 1ST HOSP IP/OBS HIGH 75: CPT

## 2024-04-30 PROCEDURE — 82306 VITAMIN D 25 HYDROXY: CPT | Mod: GEALAB | Performed by: PSYCHIATRY & NEUROLOGY

## 2024-04-30 RX ORDER — FLUTICASONE PROPIONATE 50 MCG
1 SPRAY, SUSPENSION (ML) NASAL DAILY
Status: DISCONTINUED | OUTPATIENT
Start: 2024-04-30 | End: 2024-05-01 | Stop reason: HOSPADM

## 2024-04-30 RX ORDER — ACETAMINOPHEN 325 MG/1
650 TABLET ORAL EVERY 4 HOURS PRN
Status: DISCONTINUED | OUTPATIENT
Start: 2024-04-30 | End: 2024-05-01 | Stop reason: HOSPADM

## 2024-04-30 RX ORDER — HALOPERIDOL 5 MG/ML
5 INJECTION INTRAMUSCULAR EVERY 6 HOURS PRN
Status: DISCONTINUED | OUTPATIENT
Start: 2024-04-30 | End: 2024-05-01 | Stop reason: HOSPADM

## 2024-04-30 RX ORDER — LORAZEPAM 2 MG/ML
2 INJECTION INTRAMUSCULAR EVERY 6 HOURS PRN
Status: DISCONTINUED | OUTPATIENT
Start: 2024-04-30 | End: 2024-05-01 | Stop reason: HOSPADM

## 2024-04-30 RX ORDER — DIPHENHYDRAMINE HYDROCHLORIDE 50 MG/ML
50 INJECTION INTRAMUSCULAR; INTRAVENOUS ONCE AS NEEDED
Status: DISCONTINUED | OUTPATIENT
Start: 2024-04-30 | End: 2024-05-01 | Stop reason: HOSPADM

## 2024-04-30 RX ORDER — ACETAMINOPHEN 500 MG
5 TABLET ORAL NIGHTLY PRN
Status: DISCONTINUED | OUTPATIENT
Start: 2024-04-30 | End: 2024-05-01 | Stop reason: HOSPADM

## 2024-04-30 RX ORDER — HALOPERIDOL 5 MG/1
5 TABLET ORAL EVERY 6 HOURS PRN
Status: DISCONTINUED | OUTPATIENT
Start: 2024-04-30 | End: 2024-05-01 | Stop reason: HOSPADM

## 2024-04-30 RX ORDER — DIPHENHYDRAMINE HCL 50 MG
50 CAPSULE ORAL EVERY 6 HOURS PRN
Status: DISCONTINUED | OUTPATIENT
Start: 2024-04-30 | End: 2024-05-01 | Stop reason: HOSPADM

## 2024-04-30 RX ORDER — ALUMINUM HYDROXIDE, MAGNESIUM HYDROXIDE, AND SIMETHICONE 1200; 120; 1200 MG/30ML; MG/30ML; MG/30ML
30 SUSPENSION ORAL EVERY 6 HOURS PRN
Status: DISCONTINUED | OUTPATIENT
Start: 2024-04-30 | End: 2024-05-01 | Stop reason: HOSPADM

## 2024-04-30 RX ORDER — LORAZEPAM 1 MG/1
2 TABLET ORAL EVERY 6 HOURS PRN
Status: DISCONTINUED | OUTPATIENT
Start: 2024-04-30 | End: 2024-05-01 | Stop reason: HOSPADM

## 2024-04-30 RX ORDER — HYDROXYZINE PAMOATE 50 MG/1
50 CAPSULE ORAL EVERY 4 HOURS PRN
Status: DISCONTINUED | OUTPATIENT
Start: 2024-04-30 | End: 2024-05-01 | Stop reason: HOSPADM

## 2024-04-30 RX ORDER — MICONAZOLE NITRATE 2 %
2 CREAM (GRAM) TOPICAL EVERY 2 HOUR PRN
Status: DISCONTINUED | OUTPATIENT
Start: 2024-04-30 | End: 2024-05-01 | Stop reason: HOSPADM

## 2024-04-30 RX ADMIN — FLUTICASONE PROPIONATE 1 SPRAY: 50 SPRAY, METERED NASAL at 08:31

## 2024-04-30 RX ADMIN — ACETAMINOPHEN 650 MG: 325 TABLET ORAL at 04:39

## 2024-04-30 RX ADMIN — HYDROXYZINE PAMOATE 50 MG: 50 CAPSULE ORAL at 21:48

## 2024-04-30 RX ADMIN — Medication 5 MG: at 21:47

## 2024-04-30 RX ADMIN — ACETAMINOPHEN 650 MG: 325 TABLET ORAL at 21:47

## 2024-04-30 RX ADMIN — HYDROXYZINE PAMOATE 50 MG: 50 CAPSULE ORAL at 04:39

## 2024-04-30 SDOH — SOCIAL STABILITY: SOCIAL INSECURITY: ARE YOU OR HAVE YOU BEEN THREATENED OR ABUSED PHYSICALLY, EMOTIONALLY, OR SEXUALLY BY ANYONE?: NO

## 2024-04-30 SDOH — ECONOMIC STABILITY: HOUSING INSECURITY: IN THE LAST 12 MONTHS, HOW MANY PLACES HAVE YOU LIVED?: 2

## 2024-04-30 SDOH — ECONOMIC STABILITY: INCOME INSECURITY: IN THE PAST 12 MONTHS, HAS THE ELECTRIC, GAS, OIL, OR WATER COMPANY THREATENED TO SHUT OFF SERVICE IN YOUR HOME?: NO

## 2024-04-30 SDOH — SOCIAL STABILITY: SOCIAL INSECURITY
WITHIN THE LAST YEAR, HAVE TO BEEN RAPED OR FORCED TO HAVE ANY KIND OF SEXUAL ACTIVITY BY YOUR PARTNER OR EX-PARTNER?: NO

## 2024-04-30 SDOH — SOCIAL STABILITY: SOCIAL INSECURITY: HAS ANYONE EVER THREATENED TO HURT YOUR FAMILY OR YOUR PETS?: NO

## 2024-04-30 SDOH — SOCIAL STABILITY: SOCIAL INSECURITY: ABUSE: ADULT

## 2024-04-30 SDOH — ECONOMIC STABILITY: TRANSPORTATION INSECURITY
IN THE PAST 12 MONTHS, HAS THE LACK OF TRANSPORTATION KEPT YOU FROM MEDICAL APPOINTMENTS OR FROM GETTING MEDICATIONS?: YES

## 2024-04-30 SDOH — SOCIAL STABILITY: SOCIAL NETWORK: ARE YOU MARRIED, WIDOWED, DIVORCED, SEPARATED, NEVER MARRIED, OR LIVING WITH A PARTNER?: NEVER MARRIED

## 2024-04-30 SDOH — HEALTH STABILITY: MENTAL HEALTH
STRESS IS WHEN SOMEONE FEELS TENSE, NERVOUS, ANXIOUS, OR CAN'T SLEEP AT NIGHT BECAUSE THEIR MIND IS TROUBLED. HOW STRESSED ARE YOU?: VERY MUCH

## 2024-04-30 SDOH — ECONOMIC STABILITY: INCOME INSECURITY: HOW HARD IS IT FOR YOU TO PAY FOR THE VERY BASICS LIKE FOOD, HOUSING, MEDICAL CARE, AND HEATING?: VERY HARD

## 2024-04-30 SDOH — ECONOMIC STABILITY: FOOD INSECURITY: WITHIN THE PAST 12 MONTHS, YOU WORRIED THAT YOUR FOOD WOULD RUN OUT BEFORE YOU GOT MONEY TO BUY MORE.: OFTEN TRUE

## 2024-04-30 SDOH — ECONOMIC STABILITY: FOOD INSECURITY: WITHIN THE PAST 12 MONTHS, THE FOOD YOU BOUGHT JUST DIDN'T LAST AND YOU DIDN'T HAVE MONEY TO GET MORE.: OFTEN TRUE

## 2024-04-30 SDOH — ECONOMIC STABILITY: INCOME INSECURITY: IN THE LAST 12 MONTHS, WAS THERE A TIME WHEN YOU WERE NOT ABLE TO PAY THE MORTGAGE OR RENT ON TIME?: NO

## 2024-04-30 SDOH — ECONOMIC STABILITY: HOUSING INSECURITY
IN THE LAST 12 MONTHS, WAS THERE A TIME WHEN YOU DID NOT HAVE A STEADY PLACE TO SLEEP OR SLEPT IN A SHELTER (INCLUDING NOW)?: YES

## 2024-04-30 SDOH — SOCIAL STABILITY: SOCIAL INSECURITY
WITHIN THE LAST YEAR, HAVE YOU BEEN KICKED, HIT, SLAPPED, OR OTHERWISE PHYSICALLY HURT BY YOUR PARTNER OR EX-PARTNER?: NO

## 2024-04-30 SDOH — SOCIAL STABILITY: SOCIAL INSECURITY: WITHIN THE LAST YEAR, HAVE YOU BEEN AFRAID OF YOUR PARTNER OR EX-PARTNER?: NO

## 2024-04-30 SDOH — SOCIAL STABILITY: SOCIAL INSECURITY: WITHIN THE LAST YEAR, HAVE YOU BEEN HUMILIATED OR EMOTIONALLY ABUSED IN OTHER WAYS BY YOUR PARTNER OR EX-PARTNER?: NO

## 2024-04-30 SDOH — SOCIAL STABILITY: SOCIAL NETWORK: HOW OFTEN DO YOU ATTENT MEETINGS OF THE CLUB OR ORGANIZATION YOU BELONG TO?: NEVER

## 2024-04-30 SDOH — HEALTH STABILITY: MENTAL HEALTH
HOW OFTEN DO YOU NEED TO HAVE SOMEONE HELP YOU WHEN YOU READ INSTRUCTIONS, PAMPHLETS, OR OTHER WRITTEN MATERIAL FROM YOUR DOCTOR OR PHARMACY?: NEVER

## 2024-04-30 SDOH — SOCIAL STABILITY: SOCIAL INSECURITY: DOES ANYONE TRY TO KEEP YOU FROM HAVING/CONTACTING OTHER FRIENDS OR DOING THINGS OUTSIDE YOUR HOME?: NO

## 2024-04-30 SDOH — SOCIAL STABILITY: SOCIAL INSECURITY: HAVE YOU HAD ANY THOUGHTS OF HARMING ANYONE ELSE?: NO

## 2024-04-30 SDOH — SOCIAL STABILITY: SOCIAL INSECURITY: DO YOU FEEL UNSAFE GOING BACK TO THE PLACE WHERE YOU ARE LIVING?: NO

## 2024-04-30 SDOH — SOCIAL STABILITY: SOCIAL NETWORK
IN A TYPICAL WEEK, HOW MANY TIMES DO YOU TALK ON THE PHONE WITH FAMILY, FRIENDS, OR NEIGHBORS?: MORE THAN THREE TIMES A WEEK

## 2024-04-30 SDOH — SOCIAL STABILITY: SOCIAL INSECURITY: HAVE YOU HAD THOUGHTS OF HARMING ANYONE ELSE?: NO

## 2024-04-30 SDOH — SOCIAL STABILITY: SOCIAL INSECURITY: WERE YOU ABLE TO COMPLETE ALL THE BEHAVIORAL HEALTH SCREENINGS?: YES

## 2024-04-30 SDOH — SOCIAL STABILITY: SOCIAL INSECURITY: ARE THERE ANY APPARENT SIGNS OF INJURIES/BEHAVIORS THAT COULD BE RELATED TO ABUSE/NEGLECT?: NO

## 2024-04-30 SDOH — SOCIAL STABILITY: SOCIAL INSECURITY: DO YOU FEEL ANYONE HAS EXPLOITED OR TAKEN ADVANTAGE OF YOU FINANCIALLY OR OF YOUR PERSONAL PROPERTY?: NO

## 2024-04-30 SDOH — SOCIAL STABILITY: SOCIAL NETWORK: HOW OFTEN DO YOU GET TOGETHER WITH FRIENDS OR RELATIVES?: MORE THAN THREE TIMES A WEEK

## 2024-04-30 SDOH — HEALTH STABILITY: PHYSICAL HEALTH: ON AVERAGE, HOW MANY DAYS PER WEEK DO YOU ENGAGE IN MODERATE TO STRENUOUS EXERCISE (LIKE A BRISK WALK)?: 0 DAYS

## 2024-04-30 SDOH — SOCIAL STABILITY: SOCIAL NETWORK
DO YOU BELONG TO ANY CLUBS OR ORGANIZATIONS SUCH AS CHURCH GROUPS UNIONS, FRATERNAL OR ATHLETIC GROUPS, OR SCHOOL GROUPS?: NO

## 2024-04-30 SDOH — HEALTH STABILITY: PHYSICAL HEALTH: ON AVERAGE, HOW MANY MINUTES DO YOU ENGAGE IN EXERCISE AT THIS LEVEL?: 0 MIN

## 2024-04-30 SDOH — HEALTH STABILITY: MENTAL HEALTH: EXPERIENCED ANY OF THE FOLLOWING LIFE EVENTS: LIFE-THREATENING ILLNESS OR INJURY

## 2024-04-30 SDOH — ECONOMIC STABILITY: TRANSPORTATION INSECURITY
IN THE PAST 12 MONTHS, HAS LACK OF TRANSPORTATION KEPT YOU FROM MEETINGS, WORK, OR FROM GETTING THINGS NEEDED FOR DAILY LIVING?: YES

## 2024-04-30 SDOH — SOCIAL STABILITY: SOCIAL NETWORK: HOW OFTEN DO YOU ATTEND CHURCH OR RELIGIOUS SERVICES?: NEVER

## 2024-04-30 SDOH — HEALTH STABILITY: MENTAL HEALTH: EXPERIENCED ANY OF THE FOLLOWING LIFE EVENTS: DEATH OF FAMILY/FRIEND

## 2024-04-30 ASSESSMENT — COLUMBIA-SUICIDE SEVERITY RATING SCALE - C-SSRS
5. HAVE YOU STARTED TO WORK OUT OR WORKED OUT THE DETAILS OF HOW TO KILL YOURSELF? DO YOU INTEND TO CARRY OUT THIS PLAN?: NO
6. HAVE YOU EVER DONE ANYTHING, STARTED TO DO ANYTHING, OR PREPARED TO DO ANYTHING TO END YOUR LIFE?: NO
5. HAVE YOU STARTED TO WORK OUT OR WORKED OUT THE DETAILS OF HOW TO KILL YOURSELF? DO YOU INTEND TO CARRY OUT THIS PLAN?: NO
6. HAVE YOU EVER DONE ANYTHING, STARTED TO DO ANYTHING, OR PREPARED TO DO ANYTHING TO END YOUR LIFE?: NO
1. SINCE LAST CONTACT, HAVE YOU WISHED YOU WERE DEAD OR WISHED YOU COULD GO TO SLEEP AND NOT WAKE UP?: NO
2. HAVE YOU ACTUALLY HAD ANY THOUGHTS OF KILLING YOURSELF?: NO
2. HAVE YOU ACTUALLY HAD ANY THOUGHTS OF KILLING YOURSELF?: NO
1. SINCE LAST CONTACT, HAVE YOU WISHED YOU WERE DEAD OR WISHED YOU COULD GO TO SLEEP AND NOT WAKE UP?: NO

## 2024-04-30 ASSESSMENT — LIFESTYLE VARIABLES
ANXIETY: NO ANXIETY, AT EASE
VISUAL DISTURBANCES: NOT PRESENT
HOW MANY STANDARD DRINKS CONTAINING ALCOHOL DO YOU HAVE ON A TYPICAL DAY: 1 OR 2
SUBSTANCE_ABUSE_PAST_12_MONTHS: YES
PULSE: 61
AUDIT-C TOTAL SCORE: 1
PAROXYSMAL SWEATS: NO SWEAT VISIBLE
AGITATION: NORMAL ACTIVITY
TREMOR: NO TREMOR
HOW OFTEN DO YOU HAVE A DRINK CONTAINING ALCOHOL: 4 OR MORE TIMES A WEEK
HEADACHE, FULLNESS IN HEAD: NOT PRESENT
AUDIT-C TOTAL SCORE: 1
TOTAL_SCORE: 0
NAUSEA AND VOMITING: NO NAUSEA AND NO VOMITING
HOW MANY STANDARD DRINKS CONTAINING ALCOHOL DO YOU HAVE ON A TYPICAL DAY: 1 OR 2
AUDIT-C TOTAL SCORE: 4
BLOOD PRESSURE: 122/74
SKIP TO QUESTIONS 9-10: 1
AUDITORY DISTURBANCES: NOT PRESENT
HOW OFTEN DO YOU HAVE 6 OR MORE DRINKS ON ONE OCCASION: NEVER
SUBSTANCE_ABUSE_PAST_12_MONTHS: YES
ORIENTATION AND CLOUDING OF SENSORIUM: ORIENTED AND CAN DO SERIAL ADDITIONS
AUDIT-C TOTAL SCORE: 4
PRESCIPTION_ABUSE_PAST_12_MONTHS: NO
HOW OFTEN DO YOU HAVE 6 OR MORE DRINKS ON ONE OCCASION: NEVER
CIWA TOTAL SCORE: 0
PRESCIPTION_ABUSE_PAST_12_MONTHS: NO
SKIP TO QUESTIONS 9-10: 1
HOW OFTEN DO YOU HAVE A DRINK CONTAINING ALCOHOL: MONTHLY OR LESS

## 2024-04-30 ASSESSMENT — PAIN SCALES - GENERAL
PAINLEVEL_OUTOF10: 3
PAINLEVEL_OUTOF10: 0 - NO PAIN
PAINLEVEL_OUTOF10: 2
PAINLEVEL_OUTOF10: 3
PAINLEVEL_OUTOF10: 0 - NO PAIN
PAINLEVEL_OUTOF10: 0 - NO PAIN

## 2024-04-30 ASSESSMENT — PATIENT HEALTH QUESTIONNAIRE - PHQ9
SUM OF ALL RESPONSES TO PHQ9 QUESTIONS 1 & 2: 1
1. LITTLE INTEREST OR PLEASURE IN DOING THINGS: NOT AT ALL
2. FEELING DOWN, DEPRESSED OR HOPELESS: SEVERAL DAYS

## 2024-04-30 ASSESSMENT — ACTIVITIES OF DAILY LIVING (ADL)
HEARING - LEFT EAR: FUNCTIONAL
FEEDING YOURSELF: INDEPENDENT
HEARING - RIGHT EAR: FUNCTIONAL
BATHING: INDEPENDENT
GROOMING: INDEPENDENT
WALKS IN HOME: INDEPENDENT
DRESSING YOURSELF: INDEPENDENT
TOILETING: INDEPENDENT
JUDGMENT_ADEQUATE_SAFELY_COMPLETE_DAILY_ACTIVITIES: YES
ADEQUATE_TO_COMPLETE_ADL: YES
PATIENT'S MEMORY ADEQUATE TO SAFELY COMPLETE DAILY ACTIVITIES?: YES

## 2024-04-30 ASSESSMENT — PAIN DESCRIPTION - DESCRIPTORS: DESCRIPTORS: ACHING

## 2024-04-30 ASSESSMENT — PAIN - FUNCTIONAL ASSESSMENT
PAIN_FUNCTIONAL_ASSESSMENT: 0-10

## 2024-04-30 NOTE — LETTER
The following plan is in draft form.  Please refer to the current version for the most up-to-date information.                Inpatient Treatment Plan 24   Effective from: 2024    Draft  Plan ID: [id number]               Participants as of 2024      Name Type Comments Contact Info    Isis Mcallister, RN Registered Nurse      Ron Marques MD Attending Provider  [phone]    Shankar Hays Patient  [phone]          Patient Demographics       Patient Name  Shankar Hays Legal Sex  Male   1995 SSN  [id number] Address  [facility] [id number] Phone  [phone] (Home)  [phone] (Mobile)          Patient Strengths and Barriers       Strengths (Must Choose Two)       453  Support from family;Support from friends              Barriers       453  Independent living                  Current Problems             Noted    Memory difficulties 3/3/2013    Right foot pain 2024    Right hip pain 2024    Chronic pain of right knee 2024    Depression with suicidal ideation 2024     Current Medications         Start End    cyclobenzaprine (Flexeril) 10 mg tablet 2024    Sig: Take 1 tablet (10 mg) by mouth 3 times a day as needed for muscle spasms for up to 3 days.    Class: Print    Route: oral    cyclobenzaprine (Flexeril) 5 mg tablet 2024 --    Sig: Take 1 tablet (5 mg) by mouth 3 times a day as needed for muscle spasms.    Route: oral    fluticasone (Flonase) 50 mcg/actuation nasal spray  --    Sig: Administer 1 spray into each nostril once daily. Shake gently. Before first use, prime pump. After use, clean tip and replace cap.    Class: Historical Med    Route: Each Nostril    gabapentin (Neurontin) 100 mg capsule 2024    Sig: Take 1 capsule (100 mg) by mouth 3 times a day.    Route: oral    naproxen (Naprosyn) 250 mg tablet  --    Sig: Take 1 tablet (250 mg) by mouth 2 times a day with meals.    Class:  "Historical Med    Route: oral          Hospital Medications         Dose Frequency Start End    acetaminophen (Tylenol) tablet 650 mg 650 mg Every 4 hours PRN 4/30/2024 --    Route: oral    alum-mag hydroxide-simeth (Mylanta) 200-200-20 mg/5 mL oral suspension 30 mL 30 mL Every 6 hours PRN 4/30/2024 --    Route: oral    diphenhydrAMINE (BENADryl) capsule 50 mg 50 mg Every 6 hours PRN 4/30/2024 --    Route: oral    Linked Group 1: Placed in \"Or\" Linked Group        diphenhydrAMINE (BENADryl) injection 50 mg 50 mg Once as needed 4/30/2024 --    Admin Instructions: Use if patient is unable to take oral.    Route: intramuscular    Linked Group 1: Placed in \"Or\" Linked Group        fluticasone (Flonase) nasal spray 1 spray 1 spray Daily 4/30/2024 --    Admin Instructions: Shake gently. Before first use, prime pump (press 6 times until fine spray appears). After use, clean tip and replace cap.    Route: Each Nostril    haloperidol (Haldol) tablet 5 mg 5 mg Every 6 hours PRN 4/30/2024 --    Route: oral    Linked Group 2: Placed in \"Or\" Linked Group        haloperidol lactate (Haldol) injection 5 mg 5 mg Every 6 hours PRN 4/30/2024 --    Admin Instructions: Use if patient is unable to take oral.    Route: intramuscular    Linked Group 2: Placed in \"Or\" Linked Group        hydrOXYzine pamoate (Vistaril) capsule 50 mg 50 mg Every 4 hours PRN 4/30/2024 --    Route: oral    LORazepam (Ativan) injection 2 mg 2 mg Every 6 hours PRN 4/30/2024 --    Admin Instructions: Give along with antipsychotic for additional calming effect. Use if patient is unable to take oral.    Route: intramuscular    Linked Group 3: Placed in \"Or\" Linked Group        LORazepam (Ativan) tablet 2 mg 2 mg Every 6 hours PRN 4/30/2024 --    Admin Instructions: Give along with antipsychotic for additional calming effect.    Route: oral    Linked Group 3: Placed in \"Or\" Linked Group        melatonin tablet 5 mg 5 mg Nightly PRN 4/30/2024 --    Route: oral    " nicotine polacrilex (Nicorette) gum 2 mg 2 mg Every 2 hour PRN 4/30/2024 --    Admin Instructions: Instruct patient to chew into gum, then place between the cheek and gum to enhance absorption. To increase chances of quitting, recommended to chew and park at least 9 pieces per day in the first 6 weeks of cessation.    Route: Mouth/Throat    psyllium (Hydrocil) packet 1 packet 1 packet Daily PRN 4/30/2024 --    Admin Instructions: Bowel Regimen - for prevention of constipation.    Route: oral    melatonin tablet 5 mg (Discontinued) 5 mg Daily 4/29/2024 4/30/2024    Route: oral    Reason for Discontinue: Patient Discharge          Discharge Planning      Flowsheet Row Most Recent Value   Living Arrangements Friends   Support Systems Spouse/significant other   Assistance Needed housing   Type of Residence Homeless, Private residence   Type of Animals or Pets geckos   Who is requesting discharge planning? Provider   Home or Post Acute Services Other (Comment)  [financial]   Patient expects to be discharged to: home   Does the patient need discharge transport arranged? No   Has discharge transport been arranged? No          Care Plan (Active)       Problem: Daily Care       Dates: Start:  04/30/24       Disciplines: Interdisciplinary      Goal: Daily care needs are met       Dates: Start:  04/30/24    Expected End:  05/07/24       Description: For surgery    Disciplines: Interdisciplinary      Outcomes       Date/Time User Outcome    04/30/24 050Catarino Mcallister RN Progressing               Problem: Discharge Barriers       Dates: Start:  04/30/24       Disciplines: Interdisciplinary      Goal: My discharge needs are met       Dates: Start:  04/30/24    Expected End:  05/07/24       Description: For surgery care plan    Disciplines: Interdisciplinary      Outcomes       Date/Time User Outcome    04/30/24 050Catarino Mcallister RN Progressing               Problem: Pain       Dates: Start:  04/30/24       Disciplines:  Interdisciplinary      Goal: My pain/discomfort is manageable       Dates: Start:  04/30/24       Description: For Surgery    Disciplines: Interdisciplinary      Outcomes       Date/Time User Outcome    04/30/24 0507 Isis Mcallister RN Progressing               Problem: Psychosocial Needs       Dates: Start:  04/30/24       Disciplines: Interdisciplinary      Goal: Demonstrates ability to cope with hospitalization/illness       Dates: Start:  04/30/24    Expected End:  05/07/24       Description: For surgery care plan    Disciplines: Interdisciplinary      Outcomes       Date/Time User Outcome    04/30/24 050Catarino Mcallister RN Progressing            Goal: Collaborate with me, my family, and caregiver to identify my specific goals       Dates: Start:  04/30/24    Expected End:  05/07/24       Description: For surgery care plan    Disciplines: Interdisciplinary      Outcomes       Date/Time User Outcome    04/30/24 050Catarino Mcallister RN Progressing         Flowsheet       Taken at 04/30/24 0453    Cultural Requests During Hospitalization: none by  Isis Mcallister RN    Spiritual Requests During Hospitalization: none by  Isis Mcallister RN               Problem: Risk for Suicide       Dates: Start:  04/30/24       Description: Suicide Risk    Disciplines: Interdisciplinary      Goal: Accepts medications as prescribed/needed this shift       Dates: Start:  04/30/24    Expected End:  05/07/24       Description: Accepts medications as prescribed/needed this shift    Disciplines: Interdisciplinary      Outcomes       Date/Time User Outcome    04/30/24 050Catarino Mcallister RN Progressing            Goal: Identifies supports this shift       Dates: Start:  04/30/24    Expected End:  05/07/24       Description: identifies supports this shift    Disciplines: Interdisciplinary      Outcomes       Date/Time User Outcome    04/30/24 050Catarino Mcallister RN Progressing            Goal: Makes needs known through  verbalization or behaviors this shift       Dates: Start:  04/30/24    Expected End:  05/07/24       Description: makes needs known through verbalization or behaviors this shift    Disciplines: Interdisciplinary      Outcomes       Date/Time User Outcome    04/30/24 Miley Mcallister RN Progressing            Goal: No self harm this shift       Dates: Start:  04/30/24    Expected End:  05/07/24       Description: no self harm this shift    Disciplines: Interdisciplinary      Outcomes       Date/Time User Outcome    04/30/24 Miley Mcallister RN Progressing            Goal: Read Safety Guidelines this shift       Dates: Start:  04/30/24    Expected End:  05/07/24       Description: read Safety Guidelines this shift    Disciplines: Interdisciplinary      Outcomes       Date/Time User Outcome    04/30/24 Miley Mcallister RN Progressing            Goal: Complete Mental Health Safety Plan (psychiatry only) this shift       Dates: Start:  04/30/24    Expected End:  05/07/24       Description: complete Mental Health Safety Plan (psychiatry only) this shift    Disciplines: Interdisciplinary      Outcomes       Date/Time User Outcome    04/30/24 Miley Mcallister RN Progressing               Problem: Risk for Suicide       Dates: Start:  04/30/24       Description: Suicide Risk    Disciplines: Interdisciplinary      Goal: Accepts medications as prescribed/needed this shift       Dates: Start:  04/30/24       Description: Accepts medications as prescribed/needed this shift    Disciplines: Interdisciplinary      Outcomes       Date/Time User Outcome    04/30/24 Miley Mcallister RN Progressing            Goal: Identifies supports this shift       Dates: Start:  04/30/24       Description: identifies supports this shift    Disciplines: Interdisciplinary      Outcomes       Date/Time User Outcome    04/30/24 Miley Mcallister RN Progressing            Goal: Makes needs known through verbalization or  behaviors this shift       Dates: Start:  04/30/24       Description: makes needs known through verbalization or behaviors this shift    Disciplines: Interdisciplinary      Outcomes       Date/Time User Outcome    04/30/24 050Catarino Mcallister RN Progressing            Goal: No self harm this shift       Dates: Start:  04/30/24       Description: no self harm this shift    Disciplines: Interdisciplinary      Outcomes       Date/Time User Outcome    04/30/24 Miley Mcallister RN Progressing            Goal: Read Safety Guidelines this shift       Dates: Start:  04/30/24       Description: read Safety Guidelines this shift    Disciplines: Interdisciplinary      Outcomes       Date/Time User Outcome    04/30/24 050Catarino Mcallister RN Progressing            Goal: Complete Mental Health Safety Plan (psychiatry only) this shift       Dates: Start:  04/30/24       Description: complete Mental Health Safety Plan (psychiatry only) this shift    Disciplines: Interdisciplinary      Outcomes       Date/Time User Outcome    04/30/24 050Catarino Mcallister RN Progressing               Problem: Safety       Dates: Start:  04/30/24       Disciplines: Interdisciplinary      Goal: Patient will be injury free during hospitalization       Dates: Start:  04/30/24       Description: For surgery care plan    Disciplines: Interdisciplinary      Outcomes       Date/Time User Outcome    04/30/24 050Catarino Mcallister RN Progressing            Goal: I will remain free of falls       Dates: Start:  04/30/24       Description: Assess and monitor vitals signs, neurological status including level of consciousness and orientation.  Reassess fall risk per hospital policy.    Ensure arm band on, uncluttered walking paths in room, adequate room lighting, call light and overbed table within reach, bed in low position, wheels locked, side rails up per policy, and non-skid footwear provided.    Disciplines: Interdisciplinary      Outcomes        Date/Time User Outcome    04/30/24 0507 Isis Mcallister RN Progressing         Intervention: Offer toileting every 2 hours       Dates: Start:  04/30/24            Intervention: Visual checks per hospital policy       Dates: Start:  04/30/24            Intervention: Fall risk identified       Dates: Start:  04/30/24            Intervention: Room door open       Dates: Start:  04/30/24            Intervention: Gait belt for transfers       Dates: Start:  04/30/24            Intervention: Bed/chair alarm turned on       Dates: Start:  04/30/24            Intervention: Assess and monitor medications that may increase fall risk       Dates: Start:  04/30/24       Description: benzodiazepines, narcotics, antidepressants, antihypertensives, antipsychotics, antiepileptics, sedatives, hypnotics, and diuretics         Intervention: Collaborate with physical therapy as needed       Dates: Start:  04/30/24

## 2024-04-30 NOTE — CARE PLAN
"The patient's goals for the shift include \"I would like to go home\"    The clinical goals for the shift include treatment compliant    Over the shift, the patient did not make progress toward the following goals. Barriers to progression include acuteness of illness. Recommendations to address these barriers include positive reinforcement .    Problem: Pain  Goal: My pain/discomfort is manageable  Outcome: Not Progressing     Problem: Safety  Goal: Patient will be injury free during hospitalization  Outcome: Not Progressing  Goal: I will remain free of falls  Outcome: Not Progressing     Problem: Daily Care  Goal: Daily care needs are met  Outcome: Not Progressing     Problem: Psychosocial Needs  Goal: Demonstrates ability to cope with hospitalization/illness  Outcome: Not Progressing  Goal: Collaborate with me, my family, and caregiver to identify my specific goals  Outcome: Not Progressing  Flowsheets (Taken 4/30/2024 0666)  Cultural Requests During Hospitalization: none  Spiritual Requests During Hospitalization: none     Problem: Discharge Barriers  Goal: My discharge needs are met  Outcome: Not Progressing     Problem: Risk for Suicide  Goal: Accepts medications as prescribed/needed this shift  Outcome: Not Progressing  Goal: Identifies supports this shift  Outcome: Not Progressing  Goal: Makes needs known through verbalization or behaviors this shift  Outcome: Not Progressing  Goal: No self harm this shift  Outcome: Not Progressing  Goal: Read Safety Guidelines this shift  Outcome: Not Progressing  Goal: Complete Mental Health Safety Plan (psychiatry only) this shift  Outcome: Not Progressing     Problem: Risk for Suicide  Goal: Accepts medications as prescribed/needed this shift  Outcome: Not Progressing  Goal: Identifies supports this shift  Outcome: Not Progressing  Goal: Makes needs known through verbalization or behaviors this shift  Outcome: Not Progressing  Goal: No self harm this shift  Outcome: Not " Progressing  Goal: Read Safety Guidelines this shift  Outcome: Not Progressing  Goal: Complete Mental Health Safety Plan (psychiatry only) this shift  Outcome: Not Progressing

## 2024-04-30 NOTE — NURSING NOTE
"Pt was cooperative during assessment but quite irritable at times. Pt was angry that he was not allowed to have his cell phone or smoke weed during this admission. Pt denied all anxiety,depression,SI/HI,AVH. Pt rated pain 3/10 in his left foot, PRN tylenol given. PT stated his goal is to \"get sleep,\" and his coping skill is \"smoking.\" When asked about strengths, pt stated, \"I like lots of things about myself.\" Pt given PRN vistaril as pt stated \"I am started to get angry about being here.\" Pt was changed into unit specific gowns, new ID bracelet placed. Pt is currently sleeping in bed without any obvious signs or symptoms of distress. No new orders to carry out at this time. Q15 minute safety checks maintained.  "

## 2024-04-30 NOTE — GROUP NOTE
Group Topic: Gross Motor/Balance Skills   Group Date: 4/30/2024  Start Time: 1400  End Time: 1500  Facilitators: PEDRO BanksS   Department: [facility] REH[facility] THERAP[facility]IRT[facility]    Number of Participants: 4   Group Focus: leisure skills and social skills  Treatment Modality: Other: Recreational Therapy   Interventions utilized were exploration and leisure development  Purpose: other: physical leisure, motor skills, leisure awareness, social engagement, healthy competition     Name: Shankar Hays YOB: 1995   MR: [id number]      Facilitator: Recreational Therapist  Level of Participation: did not attend  Progress: None  Comments: Patients were gathered to learn and participate in multiple movement games via the [employer]. This activity worked on fine/gross motor skills, cognitive skills, positive social engagement, healthy competition and leisure awareness.      Patient declined invitation to group activity at this time. Patient will continue to be provided with opportunities to enhance leisure skills and/or coping mechanisms.    Plan: continue with services

## 2024-04-30 NOTE — CARE PLAN
The patient's goals for the shift include get sleep    The clinical goals for the shift include orientation to unit    Problem: Pain  Goal: My pain/discomfort is manageable  Outcome: Progressing     Problem: Safety  Goal: Patient will be injury free during hospitalization  Outcome: Progressing  Goal: I will remain free of falls  Outcome: Progressing     Problem: Daily Care  Goal: Daily care needs are met  Outcome: Progressing     Problem: Psychosocial Needs  Goal: Demonstrates ability to cope with hospitalization/illness  Outcome: Progressing  Goal: Collaborate with me, my family, and caregiver to identify my specific goals  Outcome: Progressing  Flowsheets (Taken 4/30/2024 0960)  Cultural Requests During Hospitalization: none  Spiritual Requests During Hospitalization: none     Problem: Discharge Barriers  Goal: My discharge needs are met  Outcome: Progressing     Problem: Risk for Suicide  Goal: Accepts medications as prescribed/needed this shift  Outcome: Progressing  Goal: Identifies supports this shift  Outcome: Progressing  Goal: Makes needs known through verbalization or behaviors this shift  Outcome: Progressing  Goal: No self harm this shift  Outcome: Progressing  Goal: Read Safety Guidelines this shift  Outcome: Progressing  Goal: Complete Mental Health Safety Plan (psychiatry only) this shift  Outcome: Progressing     Problem: Risk for Suicide  Goal: Accepts medications as prescribed/needed this shift  Outcome: Progressing  Goal: Identifies supports this shift  Outcome: Progressing  Goal: Makes needs known through verbalization or behaviors this shift  Outcome: Progressing  Goal: No self harm this shift  Outcome: Progressing  Goal: Read Safety Guidelines this shift  Outcome: Progressing  Goal: Complete Mental Health Safety Plan (psychiatry only) this shift  Outcome: Progressing     Over the shift, the patient did make progress toward the following goals.

## 2024-04-30 NOTE — SIGNIFICANT EVENT
Application for Emergency Admission      Ready for Transfer?  Is the patient medically cleared for transfer to inpatient psychiatry: Yes  Has the patient been accepted to an inpatient psychiatric hospital: Yes    Application for Emergency Admission  IN ACCORDANCE [place]  The Chief Clinical Officer of: [facility] 4/29/2024 .8:38 PM    Reason for Hospitalization  The undersigned has reason to believe that: Shankar Hays Is a mentally ill person subject to hospitalization by court order under division B Section 5122.01 of the Revised Code, i.e., this person:    1.Yes  Represents a substantial risk of physical harm to self as manifested by evidence of threats of, or attempts at, suicide or serious self-inflicted bodily harm    2.No Represents a substantial risk of physical harm to others as manifested by evidence of recent homicidal or other violent behavior, evidence of recent threats that place another in reasonable fear of violent behavior and serious physical harm, or other evidence of present dangerousness    3.Yes Represents a substantial and immediate risk of serious physical impairment or injury to self as manifested by  evidence that the person is unable to provide for and is not providing for the person's basic physical needs because of the person's mental illness and that appropriate provision for those needs cannot be made  immediately available in the community    4.Yes Would benefit from treatment in a hospital for his mental illness and is in need of such treatment as manifested by evidence of behavior that creates a grave and imminent risk to substantial rights of others or  himself.    5.No Would benefit from treatment as manifested by evidence of behavior that indicates all of the following:       (a) The person is unlikely to survive safely in the community without supervision, based on a clinical determination.       (b) The person has a history of lack of compliance with  treatment for mental illness and one of the following applies:      (i) At least twice within the thirty-six months prior to the filing of an affidavit seeking court-ordered treatment of the person under section 5122.111 of the Revised Code, the lack of compliance has been a significant factor in necessitating hospitalization in a hospital or receipt of services in a forensic or other mental health unit of a correctional facility, provided that the thirty-six-month period shall be extended by the length of any hospitalization or incarceration of the person that occurred within the thirty-six-month period.      (ii) Within the forty-eight months prior to the filing of an affidavit seeking court-ordered treatment of the person under section 5122.111 of the Revised Code, the lack of compliance resulted in one or more acts of serious violent behavior toward self or others or threats of, or attempts at, serious physical harm to self or others, provided that the forty-eight-month period shall be extended by the length of any hospitalization or incarceration of the person that occurred within the forty-eight-month period.      (c) The person, as a result of mental illness, is unlikely to voluntarily participate in necessary treatment.       (d) In view of the person's treatment history and current behavior, the person is in need of treatment in order to prevent a relapse or deterioration that would be likely to result in substantial risk of serious harm to the person or others.    (e) Represents a substantial risk of physical harm to self or others if allowed to remain at liberty pending examination.    Therefore, it is requested that said person be admitted to the above named facility.    STATEMENT OF BELIEF    Must be filled out by one of the following: a psychiatrist, licensed physician, licensed clinical psychologist, health or ,  or .  (Statement shall include the circumstances under  which the individual was taken into custody and the reason for the person's belief that hospitalization is necessary. The statement shall also include a reference to efforts made to secure the individual's property at his residence if he was taken into custody there. Every reasonable and appropriate effort should be made to take this person into custody in the least conspicuous manner possible.)    Mr. Hays presents to the ED with suicidal ideation and after reported overdose on gabapentin in a suicide attempt. He would benefit from inpatient psychiatric hospitalization for stabilization.      Sarah Garcia MD 4/29/2024     _____________________________________________________________   Place of Employment: [facility]    STATEMENT OF OBSERVATION BY PSYCHIATRIST, LICENSED PHYSICIAN, OR LICENSED CLINICAL PSYCHOLOGIST, IF APPLICABLE    Place of Observation (e.g., [facility] mental [facility] center, general hospital, office, emergency facility)  (If applicable, please complete)    Sarah Garcia MD 4/29/2024    _____________________________________________________________

## 2024-04-30 NOTE — CARE PLAN
"Phoned pt's girlfriend, Ivette, who is very supportive;  she stated that pt is minimizing to her also, that he, pt, does not realize the seriousness of what he did, taking the pills, ingesting them in a suicide attempt.  She agrees he needs to stay and be evaluated and given treatment and milieu therapy to address his safety and stabilization;  She stated he has been affected since his mother moved to [place] near his brother's home, about 6 months ago; stated patient has only been employed at current full time job about 2 weeks, cannot support himself to live alone currently, and is residing with a friend, due to financial limitations.  She stated he is feeling down about his inability to be a man, provide, earn a living, etc.  And also is feeling pressured and anxious about it.  She and patient have been dating about 1 year and 4 months; states he smokes weed daily, drinks alcohol occasionally, and sometimes takes his Gabapentin, PRN for his sciatica.  She agrees that he \"does not get it\"; \"you put your life in danger\", she told him.  Validated and supported her feelings and statements.  Educated her on goals of treatment and this being a short term, safety and stabilization unit.  She agreed.  Sw to follow.   "
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
## 2024-04-30 NOTE — CARE PLAN
The patient's goals for the shift include      The clinical goals for the shift include      Over the shift, the patient did not make progress toward the following goals. Barriers to progression include lack of medication knowledge. Recommendations to address these barriers include more education on medications.

## 2024-04-30 NOTE — CONSULTS
Consults    Reason For Consult  Medical clearance    History Of Present Illness  Shankar Hays is a 29 y.o. male with pertinent medical history of alcohol use and nicotine use disorder who presented to [facility] after transfer from [facility] for SI patient denies.  Patient denies having suicidal ideations.  He complains of chronic mild hip pain.  He does not take anything at home for this pain.  He denied any other complaints in the review of systems.  Pertinent workup in the ER included: Tox screen positive for marijuana and ethanol level 169.  Rest of workup is unremarkable.     Past Medical History  He has a past medical history of Acute pansinusitis (04/21/2012), Anemia (05/2012), Cranial epidural abscess ([facility]) (04/21/2012), and Streptococcus viridans infection (04/21/2012), constipation, vasovagal syncope.    Surgical History  He has a past surgical history that includes Sinus surgery (05/2012).     Social History  He reports that he has been smoking cigars on a daily basis. He has never used smokeless tobacco. He reports current alcohol use-says he drinks up to 1/5 of liquor 1-2 times a week. He reports current drug use. Drug: Marijuana daily.  No other drug use.    Family History  Family History   Problem Relation Name Age of Onset    Diabetes Maternal Grandfather Drake Rory     Breast cancer Maternal Grandmother Sveta ding     Cancer Maternal Grandmother Sveta ding     Cancer Father's Brother Lucio canas      Allergies  Bactrim [sulfamethoxazole-trimethoprim]    Review of Systems  Constitutional:  Negative for chills, diaphoresis, fever.  HENT:  Negative for sore throat and trouble swallowing.    Eyes:  Negative for visual disturbance. No visual changes.   Respiratory: Neg for cough, shortness of breath and wheezing.    Cardiovascular:  Negative for chest pain, palpitations and leg swelling.   Gastrointestinal:  Negative for abdominal pain, constipation, diarrhea, nausea and vomiting.    Genitourinary:  Negative for dysuria, frequency and urgency.   Musculoskeletal:  Negative for back pain and neck pain. + chronic hip pain. No focal weakness.   Skin:  Negative for color change and rash.   Neurological:  Negative for dizziness, tremors, seizures, syncope, facial asymmetry, speech difficulty, weakness, light-headedness, numbness and headaches.     Physical Exam  Constitutional:       Appearance: Normal appearance. He is normal weight.   HENT:      Head: Normocephalic and atraumatic.      Mouth: Mucous membranes are moist.      Pharynx: Oropharynx is clear. No oropharyngeal exudate or posterior oropharyngeal erythema.   Neck: No JVD or use of accessory muscles.  Eyes:      Extraocular Movements: Extraocular movements intact.      Conjunctiva/sclera: Conjunctivae normal.      Pupils: Pupils are equal, round, and reactive to light.   Cardiovascular:      Rate and Rhythm: Normal rate and regular rhythm.      Pulses: Normal pulses.      Heart sounds: Normal heart sounds. No murmur heard.  Pulmonary:      Effort: No respiratory distress.      Breath sounds: No wheezing or rhonchi. No tachypnea & labored breathing.  Abdominal:      General: Bowel sounds are normal. There is no distension.      Palpations: Abdomen is soft. There is no mass.      Tenderness: There is no abdominal tenderness. There is no guarding or rebound.   Extremities: No swelling and palpable distal pulses.   Musculoskeletal:         General: No swelling or tenderness.      Comments: No focal weakness   Neurological:      Mental Status: He is alert and oriented to person, place, and time.      Cranial Nerves: No cranial nerve deficit.      Sensory: No sensory deficit.      Motor: No focal weakness.     Last Recorded Vitals  /74 (Patient Position: Sitting)   Pulse 61   Temp 36.7 °C (98.1 °F) (Temporal)   Resp 16   Wt 69.1 kg (152 lb 5.4 oz)   SpO2 99%     Relevant Results  Scheduled medications  fluticasone, 1 spray, Each  Nostril, Daily      Continuous medications     PRN medications  PRN medications: acetaminophen, alum-mag hydroxide-simeth, diphenhydrAMINE **OR** diphenhydrAMINE, haloperidol **OR** haloperidol lactate, hydrOXYzine pamoate, LORazepam **OR** LORazepam, melatonin, nicotine polacrilex, psyllium    Results for orders placed or performed during the hospital encounter of 04/29/24 (from the past 24 hour(s))   Electrocardiogram, 12-lead   Result Value Ref Range    Ventricular Rate 80 BPM    Atrial Rate 80 BPM    CO Interval 156 ms    QRS Duration 82 ms    QT Interval 344 ms    QTC Calculation(Bazett) 396 ms    P Axis 74 degrees    R Axis 57 degrees    T Axis 56 degrees    QRS Count 13 beats    Q Onset 223 ms    P Onset 145 ms    P Offset 197 ms    T Offset 395 ms    QTC Fredericia 378 ms   Drug Screen, Urine   Result Value Ref Range    Amphetamine Screen, Urine Presumptive Negative Presumptive Negative    Barbiturate Screen, Urine Presumptive Negative Presumptive Negative    Benzodiazepines Screen, Urine Presumptive Negative Presumptive Negative    Cannabinoid Screen, Urine Presumptive Positive (A) Presumptive Negative    Cocaine Metabolite Screen, Urine Presumptive Negative Presumptive Negative    Fentanyl Screen, Urine Presumptive Negative Presumptive Negative    Opiate Screen, Urine Presumptive Negative Presumptive Negative    Oxycodone Screen, Urine Presumptive Negative Presumptive Negative    PCP Screen, Urine Presumptive Negative Presumptive Negative    Methadone Screen, Urine Presumptive Negative Presumptive Negative   Sars-CoV-2 PCR   Result Value Ref Range    Coronavirus 2019, PCR Not Detected Not Detected   Influenza A, and B PCR   Result Value Ref Range    Flu A Result Not Detected Not Detected    Flu B Result Not Detected Not Detected     Electrocardiogram, 12-lead    Result Date: 4/29/2024  Normal sinus rhythm Right atrial enlargement Early repolarization Borderline ECG No previous ECGs available See ED  provider note for full interpretation and clinical correlation Confirmed by Jeni Blank ([id number]) on 4/29/2024 10:46:47 PM        Assessment/Plan   SI  -Pt denies  Psych primary and following  Psych to f/U regarding routine labs ordered    Seasonal Allergies  Flonase    Marijuana Use/Alcohol use   Cessation education  SW consult for outpt resources    Nicotine Use Disorder  Smoking cessation education  Nicotine replacements  Defer Wellbutrin start to psych    DVT Px  Encourage ambulation    Billie Vidal, APRN-CNP
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
## 2024-04-30 NOTE — NURSING NOTE
"Pt was mostly in bed he did get up to use the phone and eat meals . Pt stated \" I did not tell that person I wanted to hurt myself I did not and do not want to hurt myself. I would like to go home\" Anxiety 0/10 depression 0/10. Pt denied SI/HI and A/V/H. Safety maintained. Med compliant. Pt contracted for safety with this staff at this time.   "
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
## 2024-04-30 NOTE — GROUP NOTE
Group Topic: Spiritual/Devotional/Thought of the Day   Group Date: 4/30/2024  Start Time: 0730  End Time: 0800  Facilitators: FARHANA Banks   Department: [facility] REHAB THERAPY VIRTUAL    Number of Participants: 5   Group Focus: daily focus, goals, personal responsibility, and self-awareness  Treatment Modality: Other: Recreational Therapy   Interventions utilized were exploration, patient education, and support  Purpose: insight or knowledge and other: personal goals, leisure awareness     Name: Shankar Hays YOB: 1995   MR: [id number]      Facilitator: Recreational Therapist  Level of Participation: did not attend  Progress: None  Comments: Patients were provided with the Thought of the Day reading - “My actions today should reflect my concerns and be appropriate to the need.” Patients were given an opportunity to share their thoughts and encouraged to create a personal goal based on the reading.    Patient declined invitation to group activity at this time. Patient will continue to be provided with opportunities to enhance leisure skills and/or coping mechanisms.    Plan: continue with services

## 2024-04-30 NOTE — PROGRESS NOTES
Occupational Therapy                 Therapy Communication Note    Patient Name: Shankar Hays  MRN: [id number]  Today's Date: 4/30/2024     Discipline: Occupational Therapy    Missed Visit Reason: Missed Visit Reason: Other (Comment) (At 09:17, RN requested to defer OT eval and allow pt sleep. No OT eval completed.)    Missed Time: Attempt

## 2024-04-30 NOTE — GROUP NOTE
"Group Topic: Chemical Dependency - Dual Diagnosis   Group Date: 4/30/2024  Start Time: 1600  End Time: 1645  Facilitators: PEDRO BanksS   Department: [facility] REHAB THERAPY VIRTUAL    Number of Participants: 4   Group Focus: dual diagnosis, other building healthy habits, personal responsibility, and self-awareness  Treatment Modality: Other: Recreational Therapy   Interventions utilized were exploration, patient education, and support  Purpose: self-care and other: building health habits    Name: Shankar Hays YOB: 1995   MR: [id number]      Facilitator: Recreational Therapist  Level of Participation: did not attend  Progress: None  Comments: In this session, patients were given an opportunity to share about unhealthy habits/addictions they may be struggling with and provided with advice and education on how to work towards replacing those unhealthy habits with healthy options. Patients were provided with the \"Building New Habits\" handout and we had discussion about the importance/difference between setting personal goals and engaging in healthy habits to work towards achieving our overall goals. The handout included several concepts including (differentiating between goals/habits, small changes, update environment, tie new habits to other activities, practice, tell someone, track new habit, and celebrate success).    Patient declined invitation to group activity at this time, but remained in lounge at separate table. Patient will continue to be provided with opportunities to enhance leisure skills and/or coping mechanisms.    Plan: continue with services      "

## 2024-05-01 VITALS
OXYGEN SATURATION: 98 % | SYSTOLIC BLOOD PRESSURE: 119 MMHG | DIASTOLIC BLOOD PRESSURE: 74 MMHG | RESPIRATION RATE: 18 BRPM | HEIGHT: 73 IN | BODY MASS INDEX: 20.19 KG/M2 | WEIGHT: 152.34 LBS | TEMPERATURE: 97.3 F | HEART RATE: 78 BPM

## 2024-05-01 PROBLEM — R45.851 DEPRESSION WITH SUICIDAL IDEATION: Status: RESOLVED | Noted: 2024-04-29 | Resolved: 2024-05-01

## 2024-05-01 PROBLEM — F32.A DEPRESSION WITH SUICIDAL IDEATION: Status: RESOLVED | Noted: 2024-04-29 | Resolved: 2024-05-01

## 2024-05-01 PROCEDURE — 2500000001 HC RX 250 WO HCPCS SELF ADMINISTERED DRUGS (ALT 637 FOR MEDICARE OP): Performed by: PSYCHIATRY & NEUROLOGY

## 2024-05-01 PROCEDURE — 99239 HOSP IP/OBS DSCHRG MGMT >30: CPT | Performed by: PSYCHIATRY & NEUROLOGY

## 2024-05-01 RX ORDER — MICONAZOLE NITRATE 2 %
2 CREAM (GRAM) TOPICAL EVERY 2 HOUR PRN
Qty: 100 EACH | Refills: 0 | Status: SHIPPED | OUTPATIENT
Start: 2024-05-01

## 2024-05-01 RX ADMIN — NICOTINE POLACRILEX 2 MG: 2 GUM, CHEWING BUCCAL at 00:46

## 2024-05-01 RX ADMIN — NICOTINE POLACRILEX 2 MG: 2 GUM, CHEWING BUCCAL at 14:03

## 2024-05-01 ASSESSMENT — PAIN - FUNCTIONAL ASSESSMENT: PAIN_FUNCTIONAL_ASSESSMENT: 0-10

## 2024-05-01 ASSESSMENT — PAIN SCALES - GENERAL: PAINLEVEL_OUTOF10: 0 - NO PAIN

## 2024-05-01 NOTE — GROUP NOTE
Group Topic: Goals   Group Date: 5/1/2024  Start Time: 0730  End Time: 0800  Facilitators: FARHANA Crabtree   Department: [facility] REHAB THERAPY VIRTUAL    Number of Participants: 4   Group Focus: check in and goals  Treatment Modality: Recreation Therapy  Interventions utilized were: Today Is (Goal Handout), Thought for the Day (connection/support), assignment, orientation, and support  Purpose: Goal identification, insight or knowledge and self-care    Name: Shankar Hays YOB: 1995   MR: [id number]      Facilitator: Recreational Therapist  Level of Participation: did not attend  Progress: None  Comments: Participants were provided a handout which included: date, goal, planning to take time today for recovery, planning to take time doing leisure activities, activities to try today, and using the thought for the day to make an individualized goal.    Patient remained in their room throughout the session for unknown reasons.     Plan: continue with services

## 2024-05-01 NOTE — DISCHARGE SUMMARY
"Admission Date: 4-  Discharge Date: 5-      Reason For Admission: Suicidal ideation.       Discharge Diagnosis  1) Other Specified Depressive Disorder  2) Generalized Anxiety Disorder  3) Alcohol use disorder, moderate, dependence   4) Cannabis Use disorder, severe, dependence   5) Tobacco Dependence  6) Low back pain          Initial Admission :   Shankar Hays is a 29 y.o. year old male patient with a PMH significant for craniotomy d/t  cranial epidural abscess,chronic low back pain,undiagnosed anxiety disorder,marijuana dependence presents to [facility] for a chief complaint of suicide ideation.  According to the patient, he was intoxicated the day of admission as he was drinking all night with 6 of his friends and took the remaining pills of gabapentin: \"just because\". His girlfriend called EMS for fear of a potential overdose.  The patient vehemently denied wanting to kill himself.  The patient has been struggling to cope with of the passing of his 3 brothers last of which passed in 2023 secondary to gunshot wound.  He has a tumultuous relationship with his father who kicked him out of the house a couple weeks prior and [place] while intoxicated with alcohol.  The patient self describes himself as an introvert and overly sensitive,\" I carry words when somebody tell me something\".  Of note, the patient has a past medical history significant for craniotomy secondary to sinus infection.  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 He has a supportive family consisting of his mother and girlfriend who he says centers him. During the time of this encounter he denies ever wanting to end his life.         Shankar denies experiencing worsening feelings of depression for the past 2 weeks, he also denies  decreased sleep, " "decreased appetite, decreased energy, decreased concentration, increased feelings of hopelessness, helplessness or anhedonia.         Per hospitalist note 4/30/2024:   Shankar Hays is a 29 y.o. male with pertinent medical history of alcohol use and nicotine use disorder who presented to [facility] after transfer from [facility] for SI patient denies.  Patient denies having suicidal ideations.  He complains of chronic mild hip pain.  He does not take anything at home for this pain.  He denied any other complaints in the review of systems.  Pertinent workup in the ER included: Tox screen positive for marijuana and ethanol level 169.  Rest of workup is unremarkable.           Hospital Course:       Following admission to the BHU at [facility], Shankar was offered a trial of an antidepressant/antianxiety medication but did not wish to consent. Shankar also did not attend unit groups to help with coping skills. At the time of discharge, Shankar denied experiencing any hallucinations, paranoia, significant anxiety, manic symptoms, or symptoms of Major Depressive Disorder.            Mental Status Exam:   General: Appropriately groomed and dressed in hospital attire.   Appearance: Appears stated age.   Attitude: Calm, cooperative.   Behavior: Appropriate eye contact.   Motor Activity: No agitation or retardation. No EPS/TD. Normal gait and station. Normal muscle tone and bulk.   Speech: Regular rate, rhythm, volume and tone, spontaneous,  fluent. Non-pressured.   Mood: \"Alright\"   Affect: Neutral.   Thought Process: Organized, and goal directed.   Thought Content: Does not endorse suicidal ideation or any suicide plans.   Does not endorse homicidal ideation.  No overt delusions or paranoia elicited.   Thought Perception: Does not endorse auditory or visual hallucinations, does not appear to be responding to hallucinatory stimuli.   Cognition: Alert, oriented x 3. No deficits noted.  Adequate fund of knowledge. No deficit in " "recent and remote memory. No deficits in attention, concentration or language.   Insight: Fair-to-good, as patient recognizes symptoms of  illness and need for recommended treatments.    Judgment: Intact, as patient can make reasonable decisions about ordinary activities of daily living and necessary medical care recommendations.            Risk Assessment at Discharge:  Shankar is judged a minimal suicide risk due to: 1) No guns at home, 2) Denies any prior suicide attempts, 3) Denies any current suicidal ideation or suicide plans, 4) +plans for the future: \"... Looking for another job...,\" 5) No symptoms of Major Depressive Disorder elicited at discharge, 6) Not currently intoxicated with alcohol, and 7) Girlfrienrupa Rubio reported to Mariya (unit  during phone call on 5/01/2024) that she had no safety concerns of the patient harming himself if discharged today.              Discharge Medications:  Scheduled medications  fluticasone, 1 spray, Each Nostril, Daily      Continuous medications     PRN medications  PRN medications: acetaminophen, alum-mag hydroxide-simeth, diphenhydrAMINE **OR** diphenhydrAMINE, haloperidol **OR** haloperidol lactate, hydrOXYzine pamoate, LORazepam **OR** LORazepam, melatonin, nicotine polacrilex, psyllium         I spent over 30 minutes in the preparation of this summary. All 11 elements of the transition record were discussed with the patient and or caregiver and the receiving inpatient facility (if applicable).  A copy of the transition record was given to the patient and was transmitted to the outpatient provider accepting the patient's care following  discharge.    Patient's illness, medication side effects, benefits and risk were reviewed with the patient prior to discharge. The patient voiced understanding of their diagnosis, the medications recommended along with the importance of mediation compliance.  The patient was counseled not to stop medications without the " supervision of a psychiatrist.  The patient was counseled to follow-up with their outpatient medical provider as indicated. The patient was counseled that if there was an increase in mental health issues, depression, anxiety, medication side effects, self harming thoughts or thoughts to harm others, to call [facility] or 911 and come to the nearest emergency room. The patient also received information regarding advanced mental and medical health directives during this hospitalization which they could discuss with their outpatient provider. The plan was discussed with the patient, the nurse and the social work department. The patient voiced understanding and agreement with the plan.  ------------------------------------------------------------------------------------------------------------------------------------------------------------------------------------------------------  Substance Use Risk Assessment:    Nicotine: Risks of continued tobacco use was addressed with the patient which included: inpatient education and counseling of the risks of oral, esophageal as well as other organ cancers (including oral, dermatological, gastric, pancreatic, respiratory) along with the ongoing risk of neurological and cardiovascular disease/events (strokes, angina). Treatment options for cessation were offered to include: alternate tobacco products, both inpatient/outpatient counseling. Replacement products were offered during this admission and prescribed at the time of discharge along with a referral to outpatient cessation counseling.    Alcohol: The increase in morbidity and mortality, financial, both interpersonal and physical health risk in direct relationship to the use of alcohol ( in either a binge pattern or a sustained use over time) was discussed with the patient. Risks of intoxication, disinhibition, legal and interpersonal issues as well as abuse and dependence, along with the    increased risks of organ  damage (cardiac, neurological, esophageal, gastric, liver, pancreatic, renal dysfunction among others) was discussed. The risks of decreased hepatic clearance and increased medication serum drug levels along with increase in potential medication side effects, was also discussed.   Options for treatment: Discussed was reduction in alcohol consumption, referral to dual diagnosis program, residential rehabilitation programs, AA, NA, SEAN, gabapentin and oral naltrexone, if meets criteria as a candidate for these medications.    Street Drugs: Street drug use was addressed on admission, including both physical, mental, financial and psychological risk factors of ongoing use. There are no FDA prescribed treatment medications for cannabis, stimulants use/abuse (cocaine, PCP) or hallucinogens.  Patient was screened for concomitant other drugs used (tobacco, alcohol). Treatment options available were discussed ( if applicable) AA, NA, SEAN, and outpatient dual diagnosis therapy, treatment programs. Patient voiced understanding of their treatment options.          Plan:  1) Continue current medications as prescribed at discharge.  2) Follow-up:      Follow up with Emiliano GLOVER [id number] [facility]  [facility]  [place] [address]  [phone]       Additional Information     Outpatient Mental Health Appointment:  Date: Thursday, *May 16th, 2024 at 11:00 AM. In Person, with Emiliano Salmeron Therapist.  At  [facility] Location,  [address] [place] [address].  [address],  [place], [place] [address].     P. [phone];  F. [phone];     (*Note: if you need to call to reschedule or cancel, please call within 48 hours to avoid being charged for missing the above appointment)             Ron Marques MD

## 2024-05-01 NOTE — GROUP NOTE
Group Topic: Leisure Skills   Group Date: 5/1/2024  Start Time: 1400  End Time: 1445  Facilitators: FARHANA Crabtree   Department: [facility] REH[facility] THERAP[facility] VIRTUAL    Number of Participants: 6   Group Focus: leisure skills and self-awareness  Treatment Modality: Recreation Therapy  Interventions utilized were: Leisure Coat of Arms, Leisure Awareness Checklist, creativity, exploration, reminiscence, and support  Purpose: feelings, insight or knowledge, and self-worth    Name: Shankar Hays YOB: 1995   MR: [id number]      Facilitator: Recreational Therapist  Level of Participation: minimal  Quality of Participation: quiet and selectively attentive/participative  Interactions with others:  minimal and appropriate  Mood/Affect:  calm  Triggers (if applicable): N/A  Cognition:  capable  Progress: Minimal  Comments: This session involved using creative ways to answer six questions to evaluate and heighten awareness to one’s leisure/recreational lifestyle. It included identifying two things you love to do, two people that have influenced you, the place you had a positive or memorable experience, two things you enjoy to do with family/friends/or support, three things you value most, and three words you would want others to say about you. Participants were asked to share and discuss the importance of a healthy leisure lifestyle. A handout was provided that provided many examples of leisure or recreational activities organized by categories. Some categories included self-care, cooking, traveling, nature, expression, learning, connecting with others, kindness, etc. Patients were encouraged to utilize for future use and when needing some leisure based coping strategies.     Patient sat through the entire group but was selective with his engagement. He refused to complete the provided handout and was watching the unit television on [place] for most of the group. Patient did share some of his thoughts in relation to  "the activity which included his \"love of sports\" and \"coaching\" being influential to him.     Plan: continue with services      "

## 2024-05-01 NOTE — NURSING NOTE
Pt had an uneventful night, PRN ajay gum given. Pt is currently sleeping in bed without any obvious signs or symptoms of distress. No new orders to carry out at this time. Q15 minute safety checks maintained.

## 2024-05-01 NOTE — NURSING NOTE
Interdisciplinary team met with the patient and the patient met criteria for discharge. The patient was given and reviewed signs and symptoms sheet regarding when to seek treatment. The crisis hotline was given and reviewed. The patient was provided information on all medications that he was prescribed upon discharge. The patient was minimally receptive to education. Discussed the importance of compliance with medications and follow up care to promote wellness, prevent exacerbation of illness/symptoms and to prevent readmission. The patient verbalized understanding of all discharge instructions and was able to repeat back how he is supposed to take his medications, purpose of them and when his follow up appointment is. The patient denies suicidal/homicidal ideation or safety concerns at the time of this assessment. The patient will be discharged home today. The patient was receptive to a follow up call at discharge.

## 2024-05-01 NOTE — NURSING NOTE
"Pt was calm and bright during assessment. Pt denied all anxiety, depression, SI/HI and AVH. Pt rated pain 2/10 in left foot, PRN tylenol given. Pt stated his goal is \"sleep through the night,\" and his coping skill is \"TV.\" Pt stated his strengths are \"humor and caring.\" Pt did not have any scheduled medications. PRN melatonin and vistaril given. PT is currently sitting in the front lounge watching TV without any obvious signs and symptoms of distress. No new orders to carry out at this time. Q15 minute safety checks maintained.   "

## 2024-05-01 NOTE — DISCHARGE INSTR - APPOINTMENTS
Outpatient Mental Health Appointment:  Date: Thursday, *May 16th, 2024 at 11:00 AM. In Person, with Emiliano Salmeron, Therapist.  At  [facility] Location,  [address] [place] [address].  [address],  [place] [address].    P. [phone];  F. [phone];    (*Note: if you need to call to reschedule or cancel, please call within 48 hours to avoid being charged for missing the above appointment)

## 2024-05-01 NOTE — SIGNIFICANT EVENT
05/01/24 1130   Discharge Planning   Living Arrangements Friends   Support Systems Spouse/significant other;Friends/neighbors   Assistance Needed needs to be stabilized.   Type of Residence Private residence   Who is requesting discharge planning? Provider   Patient expects to be discharged to: home   Does the patient need discharge transport arranged? Yes   RoundTrip coordination needed? Yes   Has discharge transport been arranged? No   Patient Choice   Patient / Family choosing to utilize agency / facility established prior to hospitalization No     Phoned back patient's girlfriend, Ivette; she stated she thinks patient is doing good, is not worried about his safety at discharge; agrees with plan to discharge today;  she recommended follow up in [facility];  This sw scheduled him for follow up outpatient mental health care at [facility], as noted in the After Visit Summary;  He is stabilized and will be discharged today.   At 1:10 PM king scheduled patient for  via [facility]: ETA of [facility] is 3:30 PM for discharge  to go back home. Informed nursing staff and patient of the same.

## 2024-05-01 NOTE — NURSING NOTE
"The pt was calm and cooperative during the interview that took place in the pt's room away from his peers for privacy. The pt denies anxiety and depression rating them both 0/10, denies SI, HI and AVH at this time as well as pain rating it a 0/10. Last bowel movement was, \"yesterday\" 4/30/24. Coping skills, \"sleeping.\" Goal for the day, \"to go home.\" Pt strength, \"my sense of humor.\" The pt refused his morning nose spray stating, \"I only use it when I go outside, I don't want it now.\" The pt had no morning oral medications. Q 15 minute monitoring continued.   "

## 2024-05-01 NOTE — PROGRESS NOTES
Occupational Therapy                 Therapy Communication Note    Patient Name: Shankar Hays  MRN: [id number]  Today's Date: 5/1/2024     Discipline: Occupational Therapy    Missed Visit Reason: Missed Visit Reason: Patient refused (Pt awake at 10:57, eager to be discharged and repeatedly asks during this attempt if he can be discharged. Edu on role of OT and encouraged group participation while awaiting discharge info, pt adamantly refuses. Staff aware)    Missed Time: Attempt

## 2024-05-01 NOTE — CARE PLAN
"The patient's goals for the shift include \"sleep throuhg the night    The clinical goals for the shift include treatment compliacne    Problem: Pain  Goal: My pain/discomfort is manageable  Outcome: Progressing     Problem: Safety  Goal: Patient will be injury free during hospitalization  Outcome: Progressing  Goal: I will remain free of falls  Outcome: Progressing     Problem: Daily Care  Goal: Daily care needs are met  Outcome: Progressing     Problem: Psychosocial Needs  Goal: Demonstrates ability to cope with hospitalization/illness  Outcome: Progressing  Goal: Collaborate with me, my family, and caregiver to identify my specific goals  Outcome: Progressing     Problem: Discharge Barriers  Goal: My discharge needs are met  Outcome: Progressing     Problem: Risk for Suicide  Goal: Accepts medications as prescribed/needed this shift  Outcome: Progressing  Goal: Identifies supports this shift  Outcome: Progressing  Goal: Makes needs known through verbalization or behaviors this shift  Outcome: Progressing  Goal: No self harm this shift  Outcome: Progressing  Goal: Read Safety Guidelines this shift  Outcome: Progressing  Goal: Complete Mental Health Safety Plan (psychiatry only) this shift  Outcome: Progressing     Problem: Risk for Suicide  Goal: Accepts medications as prescribed/needed this shift  Outcome: Progressing  Goal: Identifies supports this shift  Outcome: Progressing  Goal: Makes needs known through verbalization or behaviors this shift  Outcome: Progressing  Goal: No self harm this shift  Outcome: Progressing  Goal: Read Safety Guidelines this shift  Outcome: Progressing  Goal: Complete Mental Health Safety Plan (psychiatry only) this shift  Outcome: Progressing     Over the shift, the patient did make progress toward the following goals.     "

## 2024-05-03 NOTE — SIGNIFICANT EVENT
Follow Up Phone Call    Outgoing phone call    Spoke to: Shankar Hays Relationship:self   Phone number: [phone]      Outcome: I left a message on answering machine   No chief complaint on file.         Diagnosis:Not applicable

## 2024-05-03 NOTE — SIGNIFICANT EVENT
Follow Up Phone Call    Outgoing phone call    Spoke to: Shankar Hays Relationship:self   Phone number: [phone]      Outcome: contacted patient/ family   No chief complaint on file.         Diagnosis:Not applicable  States he is feeling better. Denies suicidal ideation at this time. Has emergency contact numbers on his person. Encouraged compliance with medications and appointments. Voiced understanding. No further questions or concerns.